# Patient Record
Sex: FEMALE | Race: WHITE | NOT HISPANIC OR LATINO | Employment: OTHER | URBAN - METROPOLITAN AREA
[De-identification: names, ages, dates, MRNs, and addresses within clinical notes are randomized per-mention and may not be internally consistent; named-entity substitution may affect disease eponyms.]

---

## 2017-01-30 ENCOUNTER — RECORDS - HEALTHEAST (OUTPATIENT)
Dept: LAB | Facility: CLINIC | Age: 57
End: 2017-01-30

## 2017-01-30 LAB
CHOLEST SERPL-MCNC: 245 MG/DL
FASTING STATUS PATIENT QL REPORTED: YES
HDLC SERPL-MCNC: 68 MG/DL
LDLC SERPL CALC-MCNC: 144 MG/DL
TRIGL SERPL-MCNC: 167 MG/DL

## 2019-07-18 ENCOUNTER — RECORDS - HEALTHEAST (OUTPATIENT)
Dept: LAB | Facility: CLINIC | Age: 59
End: 2019-07-18

## 2019-07-18 LAB
ANION GAP SERPL CALCULATED.3IONS-SCNC: 9 MMOL/L (ref 5–18)
BUN SERPL-MCNC: 16 MG/DL (ref 8–22)
CALCIUM SERPL-MCNC: 10.6 MG/DL (ref 8.5–10.5)
CHLORIDE BLD-SCNC: 101 MMOL/L (ref 98–107)
CHOLEST SERPL-MCNC: 201 MG/DL
CO2 SERPL-SCNC: 27 MMOL/L (ref 22–31)
CREAT SERPL-MCNC: 0.76 MG/DL (ref 0.6–1.1)
FASTING STATUS PATIENT QL REPORTED: YES
GFR SERPL CREATININE-BSD FRML MDRD: >60 ML/MIN/1.73M2
GLUCOSE BLD-MCNC: 89 MG/DL (ref 70–125)
HDLC SERPL-MCNC: 51 MG/DL
LDLC SERPL CALC-MCNC: 132 MG/DL
POTASSIUM BLD-SCNC: 4.2 MMOL/L (ref 3.5–5)
SODIUM SERPL-SCNC: 137 MMOL/L (ref 136–145)
TRIGL SERPL-MCNC: 91 MG/DL

## 2020-08-13 ENCOUNTER — RECORDS - HEALTHEAST (OUTPATIENT)
Dept: LAB | Facility: CLINIC | Age: 60
End: 2020-08-13

## 2020-08-13 LAB
ANION GAP SERPL CALCULATED.3IONS-SCNC: 11 MMOL/L (ref 5–18)
BUN SERPL-MCNC: 12 MG/DL (ref 8–22)
CALCIUM SERPL-MCNC: 10.4 MG/DL (ref 8.5–10.5)
CHLORIDE BLD-SCNC: 102 MMOL/L (ref 98–107)
CHOLEST SERPL-MCNC: 221 MG/DL
CO2 SERPL-SCNC: 26 MMOL/L (ref 22–31)
CREAT SERPL-MCNC: 0.8 MG/DL (ref 0.6–1.1)
FASTING STATUS PATIENT QL REPORTED: YES
GFR SERPL CREATININE-BSD FRML MDRD: >60 ML/MIN/1.73M2
GLUCOSE BLD-MCNC: 97 MG/DL (ref 70–125)
HDLC SERPL-MCNC: 66 MG/DL
LDLC SERPL CALC-MCNC: 133 MG/DL
POTASSIUM BLD-SCNC: 4 MMOL/L (ref 3.5–5)
SODIUM SERPL-SCNC: 139 MMOL/L (ref 136–145)
TRIGL SERPL-MCNC: 109 MG/DL

## 2021-05-24 ENCOUNTER — RECORDS - HEALTHEAST (OUTPATIENT)
Dept: ADMINISTRATIVE | Facility: CLINIC | Age: 61
End: 2021-05-24

## 2021-05-25 ENCOUNTER — RECORDS - HEALTHEAST (OUTPATIENT)
Dept: ADMINISTRATIVE | Facility: CLINIC | Age: 61
End: 2021-05-25

## 2021-09-20 ENCOUNTER — LAB REQUISITION (OUTPATIENT)
Dept: LAB | Facility: CLINIC | Age: 61
End: 2021-09-20

## 2021-09-20 DIAGNOSIS — I10 ESSENTIAL (PRIMARY) HYPERTENSION: ICD-10-CM

## 2021-09-20 DIAGNOSIS — E78.5 HYPERLIPIDEMIA, UNSPECIFIED: ICD-10-CM

## 2021-09-20 LAB
ANION GAP SERPL CALCULATED.3IONS-SCNC: 12 MMOL/L (ref 5–18)
BUN SERPL-MCNC: 12 MG/DL (ref 8–22)
CALCIUM SERPL-MCNC: 10.7 MG/DL (ref 8.5–10.5)
CHLORIDE BLD-SCNC: 100 MMOL/L (ref 98–107)
CHOLEST SERPL-MCNC: 219 MG/DL
CO2 SERPL-SCNC: 25 MMOL/L (ref 22–31)
CREAT SERPL-MCNC: 0.79 MG/DL (ref 0.6–1.1)
GFR SERPL CREATININE-BSD FRML MDRD: 80 ML/MIN/1.73M2
GLUCOSE BLD-MCNC: 97 MG/DL (ref 70–125)
HDLC SERPL-MCNC: 71 MG/DL
LDLC SERPL CALC-MCNC: 126 MG/DL
POTASSIUM BLD-SCNC: 4 MMOL/L (ref 3.5–5)
SODIUM SERPL-SCNC: 137 MMOL/L (ref 136–145)
TRIGL SERPL-MCNC: 108 MG/DL

## 2021-09-20 PROCEDURE — 36415 COLL VENOUS BLD VENIPUNCTURE: CPT | Performed by: PHYSICIAN ASSISTANT

## 2021-09-20 PROCEDURE — 80061 LIPID PANEL: CPT | Performed by: PHYSICIAN ASSISTANT

## 2021-09-20 PROCEDURE — 80048 BASIC METABOLIC PNL TOTAL CA: CPT | Performed by: PHYSICIAN ASSISTANT

## 2022-09-30 ENCOUNTER — LAB REQUISITION (OUTPATIENT)
Dept: LAB | Facility: CLINIC | Age: 62
End: 2022-09-30

## 2022-09-30 DIAGNOSIS — E78.5 HYPERLIPIDEMIA, UNSPECIFIED: ICD-10-CM

## 2022-09-30 DIAGNOSIS — Z11.59 ENCOUNTER FOR SCREENING FOR OTHER VIRAL DISEASES: ICD-10-CM

## 2022-09-30 DIAGNOSIS — I10 ESSENTIAL (PRIMARY) HYPERTENSION: ICD-10-CM

## 2022-09-30 LAB
ANION GAP SERPL CALCULATED.3IONS-SCNC: 10 MMOL/L (ref 7–15)
BUN SERPL-MCNC: 11.1 MG/DL (ref 8–23)
CALCIUM SERPL-MCNC: 10.2 MG/DL (ref 8.8–10.2)
CHLORIDE SERPL-SCNC: 96 MMOL/L (ref 98–107)
CHOLEST SERPL-MCNC: 228 MG/DL
CREAT SERPL-MCNC: 0.74 MG/DL (ref 0.51–0.95)
DEPRECATED HCO3 PLAS-SCNC: 28 MMOL/L (ref 22–29)
GFR SERPL CREATININE-BSD FRML MDRD: 90 ML/MIN/1.73M2
GLUCOSE SERPL-MCNC: 97 MG/DL (ref 70–99)
HDLC SERPL-MCNC: 78 MG/DL
LDLC SERPL CALC-MCNC: 136 MG/DL
NONHDLC SERPL-MCNC: 150 MG/DL
POTASSIUM SERPL-SCNC: 3.6 MMOL/L (ref 3.4–5.3)
SODIUM SERPL-SCNC: 134 MMOL/L (ref 136–145)
TRIGL SERPL-MCNC: 71 MG/DL

## 2022-09-30 PROCEDURE — 80048 BASIC METABOLIC PNL TOTAL CA: CPT | Performed by: PHYSICIAN ASSISTANT

## 2022-09-30 PROCEDURE — 80061 LIPID PANEL: CPT | Performed by: PHYSICIAN ASSISTANT

## 2022-09-30 PROCEDURE — 86787 VARICELLA-ZOSTER ANTIBODY: CPT | Performed by: PHYSICIAN ASSISTANT

## 2022-10-03 LAB
VZV IGG SER QL IA: 44.1 INDEX
VZV IGG SER QL IA: NORMAL

## 2023-10-02 ENCOUNTER — LAB REQUISITION (OUTPATIENT)
Dept: LAB | Facility: CLINIC | Age: 63
End: 2023-10-02
Payer: COMMERCIAL

## 2023-10-02 DIAGNOSIS — I10 ESSENTIAL (PRIMARY) HYPERTENSION: ICD-10-CM

## 2023-10-02 LAB
ANION GAP SERPL CALCULATED.3IONS-SCNC: 13 MMOL/L (ref 7–15)
BUN SERPL-MCNC: 10.6 MG/DL (ref 8–23)
CALCIUM SERPL-MCNC: 11.1 MG/DL (ref 8.8–10.2)
CHLORIDE SERPL-SCNC: 100 MMOL/L (ref 98–107)
CHOLEST SERPL-MCNC: 197 MG/DL
CREAT SERPL-MCNC: 0.75 MG/DL (ref 0.51–0.95)
DEPRECATED HCO3 PLAS-SCNC: 24 MMOL/L (ref 22–29)
EGFRCR SERPLBLD CKD-EPI 2021: 88 ML/MIN/1.73M2
GLUCOSE SERPL-MCNC: 104 MG/DL (ref 70–99)
HDLC SERPL-MCNC: 65 MG/DL
LDLC SERPL CALC-MCNC: 111 MG/DL
NONHDLC SERPL-MCNC: 132 MG/DL
POTASSIUM SERPL-SCNC: 4 MMOL/L (ref 3.4–5.3)
SODIUM SERPL-SCNC: 137 MMOL/L (ref 135–145)
TRIGL SERPL-MCNC: 105 MG/DL

## 2023-10-02 PROCEDURE — 80048 BASIC METABOLIC PNL TOTAL CA: CPT | Mod: ORL | Performed by: PHYSICIAN ASSISTANT

## 2023-10-02 PROCEDURE — 80061 LIPID PANEL: CPT | Mod: ORL | Performed by: PHYSICIAN ASSISTANT

## 2023-10-16 ENCOUNTER — LAB REQUISITION (OUTPATIENT)
Dept: LAB | Facility: CLINIC | Age: 63
End: 2023-10-16

## 2023-10-16 DIAGNOSIS — E83.52 HYPERCALCEMIA: ICD-10-CM

## 2023-10-16 LAB
CALCIUM SERPL-MCNC: 10 MG/DL (ref 8.8–10.2)
CREAT SERPL-MCNC: 0.69 MG/DL (ref 0.51–0.95)
EGFRCR SERPLBLD CKD-EPI 2021: >90 ML/MIN/1.73M2
PHOSPHATE SERPL-MCNC: 2.9 MG/DL (ref 2.5–4.5)
PTH-INTACT SERPL-MCNC: 41 PG/ML (ref 15–65)

## 2023-10-16 PROCEDURE — 84100 ASSAY OF PHOSPHORUS: CPT | Performed by: PHYSICIAN ASSISTANT

## 2023-10-16 PROCEDURE — 82310 ASSAY OF CALCIUM: CPT | Performed by: PHYSICIAN ASSISTANT

## 2023-10-20 ENCOUNTER — HOSPITAL ENCOUNTER (OUTPATIENT)
Dept: BONE DENSITY | Facility: HOSPITAL | Age: 63
Discharge: HOME | End: 2023-10-20
Attending: PHYSICIAN ASSISTANT
Payer: COMMERCIAL

## 2023-10-20 ENCOUNTER — ANCILLARY PROCEDURE (OUTPATIENT)
Dept: MAMMOGRAPHY | Facility: HOSPITAL | Age: 63
End: 2023-10-20
Attending: PHYSICIAN ASSISTANT
Payer: COMMERCIAL

## 2023-10-20 DIAGNOSIS — Z78.0 ASYMPTOMATIC AGE-RELATED POSTMENOPAUSAL STATE: ICD-10-CM

## 2023-10-20 DIAGNOSIS — Z12.31 ENCOUNTER FOR SCREENING MAMMOGRAM FOR MALIGNANT NEOPLASM OF BREAST: ICD-10-CM

## 2023-10-20 PROCEDURE — 77080 DXA BONE DENSITY AXIAL: CPT

## 2023-10-20 PROCEDURE — 77067 SCR MAMMO BI INCL CAD: CPT

## 2023-10-31 ENCOUNTER — OFFICE VISIT (OUTPATIENT)
Dept: PLASTIC SURGERY | Facility: CLINIC | Age: 63
End: 2023-10-31

## 2023-10-31 DIAGNOSIS — Z41.1 ENCOUNTER FOR COSMETIC PROCEDURE: Primary | ICD-10-CM

## 2023-10-31 RX ORDER — TRETINOIN 0.5 MG/G
CREAM TOPICAL AT BEDTIME
Qty: 45 G | Refills: 3 | Status: SHIPPED | OUTPATIENT
Start: 2023-10-31

## 2023-10-31 NOTE — PROGRESS NOTES
Updated photodocumentation obtained.    Patient given quote for dermabrasion at appointment today. Quote was explained in detail, including but not limited to the $500.00 non-refundable deposit due at time of scheduling, when/where payment is due, and six weeks minimum cancellation notice. Patient verbalized understanding of quote. Signed quote was obtained (see media tab), a copy sent home with pt. Education for quoted procedures was provided both verbally and in educational take home folder including pre & post op care, medications to avoid before and after surgery, and more.     Pt given card for [facility] as well as Retinol Education Sheet.    All questions answered at this time. Pt will reach out if questions arise.    Misty Bonilla RN  10/31/2023 10:13 AM

## 2023-10-31 NOTE — PROGRESS NOTES
Facial Plastic and Reconstructive Surgery Cosmetic Consultation  10/31/23     HPI:   I had the pleasure of seeing Dany Diggs today in clinic for consultation for scar revision.   Dany Diggs is a 63 year old female.  She reports struggling with acne for many years.  She has multiple treatments for acne scarring.  This has included fullface dermabrasion, microneedling, lasers, filler.  She feels like the only thing that really helped her was the dermabrasion and that was almost 20 years ago and had a long recovery.  She is finding that as she is aging the scarring is getting more noticeable, especially on her cheeks, right greater than left.  She is interested in talking about options.      PE:  Alert and Oriented, Answering Questions Appropriately  Atraumatic, Normocephalic, Face Symmetric  Skin: Martinez 2  She has acne scarring present at the right greater than left cheeks.  There is also scarring around the mouth and the chin and pridinol sulcus area extending onto her cheeks.  No significant discoloration.  The scars are crater-like and not hypertrophic.      IMPRESSION/PLAN: This is a very pleasant 63-year-old female presenting for evaluation of acne scarring.  She had multiple treatments in the past.  We discussed many options today.  I do recommend her starting tretinoin and I put a prescription for her for that.  We discussed instructions on how to use that with the goal of using it every day.  We also discussed options including fullface dermabrasion and subscision.  I think these are good options.  We also discussed as a last resort cutting out some of the more deep scars.  I would also like her to go see the Dr. Ramesh to see if they have any lasers that she may benefit from. She will let us know what she decides.    Photodocumentation was obtained.     Risks and benefits of the procedure were discussed, including but not limited to hyper or hypopigmentation, scarring,  bleeding/bruising, irregularities of skin and underlying soft tissue, infection, asymmetry, and the need for additional procedures.

## 2023-12-14 RX ORDER — HYDROCHLOROTHIAZIDE 12.5 MG/1
CAPSULE ORAL
COMMUNITY
Start: 2023-08-14

## 2023-12-14 RX ORDER — LISINOPRIL 10 MG/1
TABLET ORAL
COMMUNITY
Start: 2023-08-14

## 2023-12-23 ENCOUNTER — HEALTH MAINTENANCE LETTER (OUTPATIENT)
Age: 63
End: 2023-12-23

## 2024-10-04 ENCOUNTER — LAB REQUISITION (OUTPATIENT)
Dept: LAB | Facility: CLINIC | Age: 64
End: 2024-10-04

## 2024-10-04 DIAGNOSIS — E83.52 HYPERCALCEMIA: ICD-10-CM

## 2024-10-04 DIAGNOSIS — E78.5 HYPERLIPIDEMIA, UNSPECIFIED: ICD-10-CM

## 2024-10-04 DIAGNOSIS — I10 ESSENTIAL (PRIMARY) HYPERTENSION: ICD-10-CM

## 2024-10-04 LAB
ANION GAP SERPL CALCULATED.3IONS-SCNC: 11 MMOL/L (ref 7–15)
BUN SERPL-MCNC: 9.4 MG/DL (ref 8–23)
CALCIUM SERPL-MCNC: 9.8 MG/DL (ref 8.8–10.4)
CHLORIDE SERPL-SCNC: 98 MMOL/L (ref 98–107)
CHOLEST SERPL-MCNC: 225 MG/DL
CREAT SERPL-MCNC: 0.75 MG/DL (ref 0.51–0.95)
EGFRCR SERPLBLD CKD-EPI 2021: 87 ML/MIN/1.73M2
FASTING STATUS PATIENT QL REPORTED: ABNORMAL
FASTING STATUS PATIENT QL REPORTED: ABNORMAL
GLUCOSE SERPL-MCNC: 104 MG/DL (ref 70–99)
HCO3 SERPL-SCNC: 26 MMOL/L (ref 22–29)
HDLC SERPL-MCNC: 82 MG/DL
LDLC SERPL CALC-MCNC: 127 MG/DL
NONHDLC SERPL-MCNC: 143 MG/DL
POTASSIUM SERPL-SCNC: 4 MMOL/L (ref 3.4–5.3)
PTH-INTACT SERPL-MCNC: 44 PG/ML (ref 15–65)
SODIUM SERPL-SCNC: 135 MMOL/L (ref 135–145)
TRIGL SERPL-MCNC: 80 MG/DL

## 2024-10-04 PROCEDURE — 83970 ASSAY OF PARATHORMONE: CPT | Performed by: PHYSICIAN ASSISTANT

## 2024-10-04 PROCEDURE — 80048 BASIC METABOLIC PNL TOTAL CA: CPT | Performed by: PHYSICIAN ASSISTANT

## 2024-10-04 PROCEDURE — 80061 LIPID PANEL: CPT | Performed by: PHYSICIAN ASSISTANT

## 2025-01-18 ENCOUNTER — HEALTH MAINTENANCE LETTER (OUTPATIENT)
Age: 65
End: 2025-01-18

## 2025-03-03 ENCOUNTER — LAB REQUISITION (OUTPATIENT)
Dept: LAB | Facility: CLINIC | Age: 65
End: 2025-03-03

## 2025-03-03 DIAGNOSIS — M62.838 OTHER MUSCLE SPASM: ICD-10-CM

## 2025-03-03 PROCEDURE — 80051 ELECTROLYTE PANEL: CPT | Performed by: PHYSICIAN ASSISTANT

## 2025-03-03 PROCEDURE — 82550 ASSAY OF CK (CPK): CPT | Performed by: PHYSICIAN ASSISTANT

## 2025-03-03 PROCEDURE — 82040 ASSAY OF SERUM ALBUMIN: CPT | Performed by: PHYSICIAN ASSISTANT

## 2025-03-04 LAB
ALBUMIN SERPL BCG-MCNC: 4.2 G/DL (ref 3.5–5.2)
ALP SERPL-CCNC: 65 U/L (ref 40–150)
ALT SERPL W P-5'-P-CCNC: 16 U/L (ref 0–50)
ANION GAP SERPL CALCULATED.3IONS-SCNC: 11 MMOL/L (ref 7–15)
AST SERPL W P-5'-P-CCNC: 18 U/L (ref 0–45)
BILIRUB SERPL-MCNC: 0.5 MG/DL
BUN SERPL-MCNC: 15.1 MG/DL (ref 8–23)
CALCIUM SERPL-MCNC: 10 MG/DL (ref 8.8–10.4)
CHLORIDE SERPL-SCNC: 95 MMOL/L (ref 98–107)
CK SERPL-CCNC: 107 U/L (ref 26–192)
CREAT SERPL-MCNC: 0.7 MG/DL (ref 0.51–0.95)
EGFRCR SERPLBLD CKD-EPI 2021: >90 ML/MIN/1.73M2
GLUCOSE SERPL-MCNC: 109 MG/DL (ref 70–99)
HCO3 SERPL-SCNC: 25 MMOL/L (ref 22–29)
POTASSIUM SERPL-SCNC: 3.8 MMOL/L (ref 3.4–5.3)
PROT SERPL-MCNC: 7.1 G/DL (ref 6.4–8.3)
SODIUM SERPL-SCNC: 131 MMOL/L (ref 135–145)

## 2025-04-24 ENCOUNTER — TRANSFERRED RECORDS (OUTPATIENT)
Dept: HEALTH INFORMATION MANAGEMENT | Facility: CLINIC | Age: 65
End: 2025-04-24
Payer: COMMERCIAL

## 2025-05-02 ENCOUNTER — TRANSFERRED RECORDS (OUTPATIENT)
Dept: HEALTH INFORMATION MANAGEMENT | Facility: CLINIC | Age: 65
End: 2025-05-02
Payer: COMMERCIAL

## 2025-05-02 ENCOUNTER — ANCILLARY PROCEDURE (OUTPATIENT)
Dept: RADIOLOGY | Facility: CLINIC | Age: 65
End: 2025-05-02
Payer: COMMERCIAL

## 2025-05-06 ENCOUNTER — TRANSCRIBE ORDERS (OUTPATIENT)
Dept: OTHER | Age: 65
End: 2025-05-06

## 2025-05-06 ENCOUNTER — MEDICAL CORRESPONDENCE (OUTPATIENT)
Dept: HEALTH INFORMATION MANAGEMENT | Facility: CLINIC | Age: 65
End: 2025-05-06
Payer: COMMERCIAL

## 2025-05-06 DIAGNOSIS — C50.911 INVASIVE DUCTAL CARCINOMA OF RIGHT BREAST (H): Primary | ICD-10-CM

## 2025-05-07 ENCOUNTER — LAB (OUTPATIENT)
Dept: LAB | Facility: CLINIC | Age: 65
End: 2025-05-07
Payer: COMMERCIAL

## 2025-05-07 ENCOUNTER — OFFICE VISIT (OUTPATIENT)
Dept: SURGERY | Facility: CLINIC | Age: 65
End: 2025-05-07
Attending: SURGERY
Payer: COMMERCIAL

## 2025-05-07 ENCOUNTER — PATIENT OUTREACH (OUTPATIENT)
Dept: ONCOLOGY | Facility: CLINIC | Age: 65
End: 2025-05-07

## 2025-05-07 VITALS — WEIGHT: 147 LBS | BODY MASS INDEX: 25.1 KG/M2 | HEIGHT: 64 IN

## 2025-05-07 DIAGNOSIS — C50.911 INVASIVE DUCTAL CARCINOMA OF RIGHT BREAST (H): ICD-10-CM

## 2025-05-07 PROCEDURE — 99204 OFFICE O/P NEW MOD 45 MIN: CPT | Performed by: SURGERY

## 2025-05-07 PROCEDURE — G0463 HOSPITAL OUTPT CLINIC VISIT: HCPCS | Performed by: SURGERY

## 2025-05-07 RX ORDER — VALACYCLOVIR HYDROCHLORIDE 500 MG/1
TABLET, FILM COATED ORAL
COMMUNITY
Start: 2025-01-08

## 2025-05-07 RX ORDER — ATORVASTATIN CALCIUM 10 MG/1
TABLET, FILM COATED ORAL
COMMUNITY
Start: 2025-03-25

## 2025-05-07 RX ORDER — CYCLOBENZAPRINE HCL 5 MG
TABLET ORAL
COMMUNITY
Start: 2025-04-21

## 2025-05-07 RX ORDER — AMOXICILLIN 500 MG
1200 CAPSULE ORAL DAILY
COMMUNITY

## 2025-05-07 NOTE — PATIENT INSTRUCTIONS
Someone from [facility] will call you to set up an appointment to discuss reconstruction.    Be sure to check with your insurance for coverage.    Call me when you have an appointment and who you will be seeing.    Call with any questions!    Thank you,      Susan Lombardi, RN CBCN  RN Breast Surgery Coordinator  [facility]  [phone]

## 2025-05-07 NOTE — LETTER
5/7/2025      Dany Diggs  [address] [place] [id number]      Dear Colleague,    Thank you for referring your patient, Dany Diggs, to the [facility] [place]. Please see a copy of my visit note below.    History:  This is a 64 year old female who I'm asked to see by Nataly Ortiz PA-C for evaluation of a right breast cancer.  She presents with her sister-in-law, Nova.  This was picked up by the patient.  She randomly felt a mass deep to her right nipple.  She denies having any other breast symptoms along with it such as nipple discharge or skin changes.  Diagnostic mammogram revealed a 12 mm mass in the location of the palpable abnormality.  Ultrasound had a corresponding irregularity along with 2 other small's suspicious nodules measuring 4 and 5 mm respectively.  A needle biopsy was done of the main lesion and one of the smaller satellite lesions which both show a grade II invasive ductal carcinoma.  It is estrogen receptor positive, progesterone receptor positive, and HER-2 indeterminant and pending FISH.  After the biopsies she has had significant ecchymosis.    Past medical history:  HTN  Dyslipidemia  GERD    Past surgical history:  Salpingectomy for ectopic pregnancy  Nasal surgery    Medications:     atorvastatin (LIPITOR) 10 MG tablet, , Disp: , Rfl:      cyclobenzaprine (FLEXERIL) 5 MG tablet, , Disp: , Rfl:      esomeprazole (NEXIUM) 20 MG DR capsule, Take 20 mg by mouth., Disp: , Rfl:      hydrochlorothiazide (MICROZIDE) 12.5 MG capsule, , Disp: , Rfl:      lisinopril (ZESTRIL) 10 MG tablet, , Disp: , Rfl:      valACYclovir (VALTREX) 500 MG tablet, , Disp: , Rfl:      tretinoin (RETIN-A) 0.05 % external cream, Apply topically at bedtime, Disp: 45 g, Rfl: 3    Allergies:  Sulfa  Codeine  Morphine    Social History:  Consumes alcohol socially.  Denies tobacco, marijuana, and illicit drug use.    Family History:  A sister had breast cancer in her 30s.   "A brother had melanoma.    Review of systems:  General ROS: No complaints or constitutional symptoms  Skin: No complaints or symptoms   Hematologic/Lymphatic: No symptoms or complaints  Psychiatric: No symptoms or complaints  Endocrine: No excessive fatigue, no hypermetabolic symptoms reported  Respiratory ROS: No cough, shortness of breath, or wheezing  Cardiovascular ROS: No chest pain or dyspnea on exertion  Breast ROS: Per HPI  Gastrointestinal ROS: No abdominal pain, nausea, diarrhea, or constipation  Musculoskeletal ROS: No recent injuries reported  Neurological ROS: No focal neurologic defects reported.      Physical exam:  Ht 1.626 m (5' 4\")   Wt 66.7 kg (147 lb)   BMI 25.23 kg/m    General: Alert, cooperative, appears stated age   Skin: Skin color, texture, turgor normal, no rashes or lesions   Lymphatic: No obvious adenopathy, no swelling   Eyes: No scleral icterus, pupils equal  HENT: No traumatic injury to the head or face, no gross abnormalities  Lungs: Normal respiratory effort, breath sounds equal bilaterally  Heart: Regular rate and rhythm  Breasts: Ecchymosis to the lower half of the right breast.  There is a 1 cm superficial mass deep to the areola at 7:00.  No other palpable abnormalities bilaterally.  Abdomen: Soft, non-distended and non-tender to palpation  Neurologic: Grossly intact    Imaging:  Pertinent images personally reviewed by myself and discussed with the patient.    Radiology reports:  Mammogram of the right breast demonstrates a 12 mm irregular mass in the subareolar tissue.  Ultrasound reveals a 12 x 7 x 8 mm irregular lobulated mass corresponding to the mammographic abnormality.  There are 2 smaller lesions in the adjacent parenchyma measuring 4 and 5 mm respectively.  The axilla is negative.    My interpretation:  Dense breast tissue.  2 biopsy clips seen approximately 4.5 cm apart.    Pathology:  A) RIGHT BREAST, 7:00, 1 CM FROM NIPPLE, ULTRASOUND-GUIDED CORE BIOPSY:   1. " Invasive ductal carcinoma      a. [place] grade: II of III; [place] score: 6 of 9      b. Angio-lymphatic invasion: Absent      c. Associated DCIS: Present      d. Subtype: Solid and papillary with focal necrosis      e. Grade of DCIS: 1-2 of 3   2. Breast Ancillary Testing:        a. Hormone Receptors:             Estrogen receptor: Positive (96%, strong staining)             Progesterone receptor: Positive (76%, moderate staining)       b. HER2 by IHC: Equivocal (2+ by manual morphometry; reflex to FISH, ordered 5/6/2025)       c. Ki-67: 22%     B) RIGHT BREAST, 7:00, 3 CM FROM NIPPLE, ULTRASOUND-GUIDED CORE BIOPSY:   1. Invasive ductal carcinoma measuring 3 mm      a. [place] grade: II of III; [place] score: 6 of 9      b. Angio-lymphatic invasion: Absent      c. Associated DCIS: Indeterminate   2. Estrogen and progesterone receptor immunohistochemistry, and HER2      analysis are deferred to part A     IMPRESSION:  Right breast multifocal invasive ductal carcinoma     - Grade II, mT1, N0, ER/IA+, HER2 pending FISH  -->  anatomic stage IA    PLAN:   Discussed the surgical options for treatment of breast cancer which generally are a lumpectomy (partial mastectomy) combined with radiation versus a mastectomy.  Explained that the survival benefit is the same for both.  The difference is in local recurrence risk.  The patient is a poor candidate for a lumpectomy.  I estimate we would need to remove at least 4 cm of breast tissue to ensure clear margins.  Her tumor is also close to the nipple.  Cosmetically it would not be favorable.  Discussed SLN biopsy.  The procedure and rationale were explained.  Discussed that at this point we do not know yet whether or not she will need chemotherapy and we may not know until we get all of the results of surgery back.  Sometimes the need for chemotherapy is dependent upon an Oncotype score or HER2 positivity.  Since the tumor is estrogen receptor positive, she will  be a candidate for endocrine therapy.    After our discussion, all questions were answered to satisfaction.  With her personal and family history of breast cancer, a referral has been placed to genetic counseling.  We will also initiate the breast actionable panel.  Surgically, she is interested in pursuing right mastectomy with immediate reconstruction.  Therefore, she will meet with plastic surgery next.  A referral has been placed and she will let us know who she will be seeing.  We will plan to schedule a right mastectomy with sentinel lymph node biopsy at the patient's earliest availability.  A delay in care could seriously jeopardize the patient's life and health.  The procedure is performed under general anesthesia.  Most women are discharged home the same day with drains. The risks and benefits of surgery were explained.  Also talked about expected recovery time.  She would then follow-up with myself 2 weeks after surgery to review final pathology and next steps.    Zulay Larson DO  General Surgeon  [facility]  Breast [address] [address]  Office: [phone]  Employed by - [facility]        Again, thank you for allowing me to participate in the care of your patient.        Sincerely,        Zulay Larson DO    Electronically signed

## 2025-05-07 NOTE — PROGRESS NOTES
Writer received referral to Cancer Risk Management/Genetic Counseling.    Referred for:   Invasive ductal carcinoma of right breast (H)      Referred By    Provider Department Location Phone   Zulay Larson DO [facility] [phone]     Referral reviewed for appropriate plan, and sent to New Patient Scheduling ([phone]) for completion.    Bernie Garcia, RN, BSN  Oncology New Patient Nurse Navigator   [facility]

## 2025-05-07 NOTE — NURSING NOTE
Dany presents to [facility] today for a surgical consult with Dr. Larson  regarding her newly diagnosed  breast cancer.  She is accompanied by her sister in law for consult.  RN assessment and EMR update.  Patient given a Breast Cancer Packet, contents reviewed.  She met with Dr. Larson  see dictation for details of visit. She will plan genetic testing lab draw today.  Consent signed.  She will make an appointment with  Plastic surgeon group and call with whom she is scheduled with.  Instructed her to check with her insurance company for in-network. As of now, she is thinking of having a unilateral mastectomy with sentinel node biopsy.  Support provided, invited calls.

## 2025-05-08 ENCOUNTER — TELEPHONE (OUTPATIENT)
Dept: SURGERY | Facility: CLINIC | Age: 65
End: 2025-05-08
Payer: COMMERCIAL

## 2025-05-08 ENCOUNTER — PATIENT OUTREACH (OUTPATIENT)
Dept: CARE COORDINATION | Facility: CLINIC | Age: 65
End: 2025-05-08
Payer: COMMERCIAL

## 2025-05-08 NOTE — TELEPHONE ENCOUNTER
I called and talked w/ patient this morning. She has not called her insurance company to check her in network benefits re:  Plastic Surgery. She is going to call them this morning and then call me back. We'll schedule accordingly after that. I will await her return call.

## 2025-05-12 ENCOUNTER — TELEPHONE (OUTPATIENT)
Dept: SURGERY | Facility: CLINIC | Age: 65
End: 2025-05-12
Payer: COMMERCIAL

## 2025-05-12 ENCOUNTER — PATIENT OUTREACH (OUTPATIENT)
Dept: CARE COORDINATION | Facility: CLINIC | Age: 65
End: 2025-05-12
Payer: COMMERCIAL

## 2025-05-12 NOTE — TELEPHONE ENCOUNTER
Called Dany with the results of Her2 by Fish, Negative.  She will proceed as planned with surgery.  She is seeing Dr. Nazario on Thursday, 5-15-25. She is opting for a unilateral mastectomy with immediate reconstruction.  Will notify Dr. Larson and Stephen, who can start working on a surgery date.

## 2025-05-13 NOTE — TELEPHONE ENCOUNTER
Dany and I are playing phone tag. In my  return call to her, I left a detailed message, offering 06.18.25 at [facility]. I will await her return call.

## 2025-05-13 NOTE — TELEPHONE ENCOUNTER
Dany returned my call and she is confirmed for surgery w/ Dr. Larson on 06.18.25 at [facility]. We went over details and I let her know I will send a confirmation letter to her MyChart. She's in agreement with the plan.

## 2025-05-13 NOTE — TELEPHONE ENCOUNTER
LM x1 to schedule surgery w/ Dr. Larson. Will offer 06.18.25 at [facility] when she returns my call.

## 2025-05-15 ENCOUNTER — MYC MEDICAL ADVICE (OUTPATIENT)
Dept: SURGERY | Facility: CLINIC | Age: 65
End: 2025-05-15
Payer: COMMERCIAL

## 2025-05-15 LAB — INTERPRETATION SERPL IEP-IMP: NORMAL

## 2025-05-22 ENCOUNTER — LAB (OUTPATIENT)
Dept: LAB | Facility: CLINIC | Age: 65
End: 2025-05-22
Payer: COMMERCIAL

## 2025-05-22 DIAGNOSIS — C50.911 INVASIVE DUCTAL CARCINOMA OF RIGHT BREAST (H): Primary | ICD-10-CM

## 2025-05-22 PROCEDURE — G0452 MOLECULAR PATHOLOGY INTERPR: HCPCS | Mod: 26 | Performed by: PATHOLOGY

## 2025-05-22 PROCEDURE — 81162 BRCA1&2 GEN FULL SEQ DUP/DEL: CPT | Performed by: SURGERY

## 2025-05-30 ENCOUNTER — LAB REQUISITION (OUTPATIENT)
Dept: LAB | Facility: CLINIC | Age: 65
End: 2025-05-30

## 2025-05-30 DIAGNOSIS — I10 ESSENTIAL (PRIMARY) HYPERTENSION: ICD-10-CM

## 2025-05-30 PROCEDURE — 82374 ASSAY BLOOD CARBON DIOXIDE: CPT | Performed by: PHYSICIAN ASSISTANT

## 2025-05-31 LAB
ANION GAP SERPL CALCULATED.3IONS-SCNC: 11 MMOL/L (ref 7–15)
BUN SERPL-MCNC: 10 MG/DL (ref 8–23)
CALCIUM SERPL-MCNC: 9.8 MG/DL (ref 8.8–10.4)
CHLORIDE SERPL-SCNC: 97 MMOL/L (ref 98–107)
CREAT SERPL-MCNC: 0.76 MG/DL (ref 0.51–0.95)
EGFRCR SERPLBLD CKD-EPI 2021: 86 ML/MIN/1.73M2
GLUCOSE SERPL-MCNC: 90 MG/DL (ref 70–99)
HCO3 SERPL-SCNC: 26 MMOL/L (ref 22–29)
POTASSIUM SERPL-SCNC: 3.9 MMOL/L (ref 3.4–5.3)
SODIUM SERPL-SCNC: 134 MMOL/L (ref 135–145)

## 2025-06-16 RX ORDER — BETAMETHASONE DIPROPIONATE 0.05 %
OINTMENT (GRAM) TOPICAL 2 TIMES DAILY
COMMUNITY

## 2025-06-16 RX ORDER — ASPIRIN 81 MG/1
81 TABLET ORAL DAILY
COMMUNITY
End: 2025-07-02

## 2025-06-16 RX ORDER — LORAZEPAM 0.5 MG/1
0.5 TABLET ORAL EVERY 6 HOURS PRN
COMMUNITY

## 2025-06-18 ENCOUNTER — ANESTHESIA EVENT (OUTPATIENT)
Dept: SURGERY | Facility: HOSPITAL | Age: 65
End: 2025-06-18
Payer: COMMERCIAL

## 2025-06-18 ENCOUNTER — HOSPITAL ENCOUNTER (OUTPATIENT)
Facility: HOSPITAL | Age: 65
Discharge: HOME | End: 2025-06-18
Attending: SURGERY | Admitting: SURGERY
Payer: COMMERCIAL

## 2025-06-18 ENCOUNTER — ANESTHESIA (OUTPATIENT)
Dept: SURGERY | Facility: HOSPITAL | Age: 65
End: 2025-06-18
Payer: COMMERCIAL

## 2025-06-18 ENCOUNTER — HOSPITAL ENCOUNTER (OUTPATIENT)
Dept: NUCLEAR MEDICINE | Facility: HOSPITAL | Age: 65
Discharge: HOME | End: 2025-06-18
Attending: SURGERY | Admitting: SURGERY
Payer: COMMERCIAL

## 2025-06-18 VITALS
OXYGEN SATURATION: 98 % | RESPIRATION RATE: 16 BRPM | HEART RATE: 73 BPM | DIASTOLIC BLOOD PRESSURE: 87 MMHG | SYSTOLIC BLOOD PRESSURE: 161 MMHG | TEMPERATURE: 97.3 F | WEIGHT: 143.9 LBS | BODY MASS INDEX: 24.7 KG/M2

## 2025-06-18 DIAGNOSIS — Z85.3 HISTORY OF BREAST CANCER: Primary | ICD-10-CM

## 2025-06-18 DIAGNOSIS — C50.911 INVASIVE DUCTAL CARCINOMA OF RIGHT BREAST (H): ICD-10-CM

## 2025-06-18 PROCEDURE — 370N000017 HC ANESTHESIA TECHNICAL FEE, PER MIN: Performed by: SURGERY

## 2025-06-18 PROCEDURE — 250N000011 HC RX IP 250 OP 636: Performed by: PLASTIC SURGERY

## 2025-06-18 PROCEDURE — 272N000001 HC OR GENERAL SUPPLY STERILE: Performed by: SURGERY

## 2025-06-18 PROCEDURE — 250N000011 HC RX IP 250 OP 636: Performed by: SURGERY

## 2025-06-18 PROCEDURE — 343N000001 HC RX 343 MED OP 636: Performed by: SURGERY

## 2025-06-18 PROCEDURE — 250N000009 HC RX 250: Performed by: NURSE ANESTHETIST, CERTIFIED REGISTERED

## 2025-06-18 PROCEDURE — 250N000013 HC RX MED GY IP 250 OP 250 PS 637: Performed by: PHYSICIAN ASSISTANT

## 2025-06-18 PROCEDURE — 360N000076 HC SURGERY LEVEL 3, PER MIN: Performed by: SURGERY

## 2025-06-18 PROCEDURE — 250N000011 HC RX IP 250 OP 636: Performed by: NURSE ANESTHETIST, CERTIFIED REGISTERED

## 2025-06-18 PROCEDURE — 88342 IMHCHEM/IMCYTCHM 1ST ANTB: CPT | Mod: TC | Performed by: SURGERY

## 2025-06-18 PROCEDURE — 38525 BIOPSY/REMOVAL LYMPH NODES: CPT | Mod: 51 | Performed by: SURGERY

## 2025-06-18 PROCEDURE — 19303 MAST SIMPLE COMPLETE: CPT | Mod: RT | Performed by: SURGERY

## 2025-06-18 PROCEDURE — 250N000009 HC RX 250: Performed by: ANESTHESIOLOGY

## 2025-06-18 PROCEDURE — A9520 TC99 TILMANOCEPT DIAG 0.5MCI: HCPCS | Performed by: SURGERY

## 2025-06-18 PROCEDURE — 710N000009 HC RECOVERY PHASE 1, LEVEL 1, PER MIN: Performed by: SURGERY

## 2025-06-18 PROCEDURE — 38792 RA TRACER ID OF SENTINL NODE: CPT

## 2025-06-18 PROCEDURE — 258N000003 HC RX IP 258 OP 636: Performed by: NURSE ANESTHETIST, CERTIFIED REGISTERED

## 2025-06-18 PROCEDURE — L8600 IMPLANT BREAST SILICONE/EQ: HCPCS | Performed by: SURGERY

## 2025-06-18 PROCEDURE — 999N000141 HC STATISTIC PRE-PROCEDURE NURSING ASSESSMENT: Performed by: SURGERY

## 2025-06-18 PROCEDURE — 250N000013 HC RX MED GY IP 250 OP 250 PS 637: Performed by: ANESTHESIOLOGY

## 2025-06-18 PROCEDURE — 710N000012 HC RECOVERY PHASE 2, PER MINUTE: Performed by: SURGERY

## 2025-06-18 PROCEDURE — 258N000003 HC RX IP 258 OP 636: Performed by: ANESTHESIOLOGY

## 2025-06-18 PROCEDURE — 250N000009 HC RX 250: Performed by: PLASTIC SURGERY

## 2025-06-18 PROCEDURE — 250N000025 HC SEVOFLURANE, PER MIN: Performed by: SURGERY

## 2025-06-18 DEVICE — IMPLANTABLE DEVICE: Type: IMPLANTABLE DEVICE | Site: BREAST | Status: FUNCTIONAL

## 2025-06-18 RX ORDER — ONDANSETRON 2 MG/ML
INJECTION INTRAMUSCULAR; INTRAVENOUS PRN
Status: DISCONTINUED | OUTPATIENT
Start: 2025-06-18 | End: 2025-06-18

## 2025-06-18 RX ORDER — SODIUM CHLORIDE, SODIUM LACTATE, POTASSIUM CHLORIDE, CALCIUM CHLORIDE 600; 310; 30; 20 MG/100ML; MG/100ML; MG/100ML; MG/100ML
INJECTION, SOLUTION INTRAVENOUS CONTINUOUS
Status: DISCONTINUED | OUTPATIENT
Start: 2025-06-18 | End: 2025-06-18 | Stop reason: HOSPADM

## 2025-06-18 RX ORDER — ACETAMINOPHEN 325 MG/1
650 TABLET ORAL EVERY 4 HOURS PRN
Qty: 50 TABLET | Refills: 0 | Status: SHIPPED | OUTPATIENT
Start: 2025-06-18 | End: 2025-07-02

## 2025-06-18 RX ORDER — KETAMINE HYDROCHLORIDE 10 MG/ML
INJECTION INTRAMUSCULAR; INTRAVENOUS PRN
Status: DISCONTINUED | OUTPATIENT
Start: 2025-06-18 | End: 2025-06-18

## 2025-06-18 RX ORDER — CEPHALEXIN 500 MG/1
500 CAPSULE ORAL 3 TIMES DAILY
Qty: 21 CAPSULE | Refills: 0 | Status: SHIPPED | OUTPATIENT
Start: 2025-06-18 | End: 2025-06-25

## 2025-06-18 RX ORDER — NALOXONE HYDROCHLORIDE 0.4 MG/ML
0.1 INJECTION, SOLUTION INTRAMUSCULAR; INTRAVENOUS; SUBCUTANEOUS
Status: DISCONTINUED | OUTPATIENT
Start: 2025-06-18 | End: 2025-06-18 | Stop reason: HOSPADM

## 2025-06-18 RX ORDER — PROPOFOL 10 MG/ML
INJECTION, EMULSION INTRAVENOUS PRN
Status: DISCONTINUED | OUTPATIENT
Start: 2025-06-18 | End: 2025-06-18

## 2025-06-18 RX ORDER — ACETAMINOPHEN 325 MG/1
650 TABLET ORAL
Status: DISCONTINUED | OUTPATIENT
Start: 2025-06-18 | End: 2025-06-18 | Stop reason: HOSPADM

## 2025-06-18 RX ORDER — FENTANYL CITRATE 50 UG/ML
25 INJECTION, SOLUTION INTRAMUSCULAR; INTRAVENOUS EVERY 5 MIN PRN
Status: DISCONTINUED | OUTPATIENT
Start: 2025-06-18 | End: 2025-06-18 | Stop reason: HOSPADM

## 2025-06-18 RX ORDER — CEFAZOLIN SODIUM/WATER 2 G/20 ML
2 SYRINGE (ML) INTRAVENOUS
Status: COMPLETED | OUTPATIENT
Start: 2025-06-18 | End: 2025-06-18

## 2025-06-18 RX ORDER — LIDOCAINE 40 MG/G
CREAM TOPICAL
Status: DISCONTINUED | OUTPATIENT
Start: 2025-06-18 | End: 2025-06-18 | Stop reason: HOSPADM

## 2025-06-18 RX ORDER — DEXAMETHASONE SODIUM PHOSPHATE 4 MG/ML
4 INJECTION, SOLUTION INTRA-ARTICULAR; INTRALESIONAL; INTRAMUSCULAR; INTRAVENOUS; SOFT TISSUE
Status: DISCONTINUED | OUTPATIENT
Start: 2025-06-18 | End: 2025-06-18 | Stop reason: HOSPADM

## 2025-06-18 RX ORDER — LABETALOL HYDROCHLORIDE 5 MG/ML
INJECTION, SOLUTION INTRAVENOUS PRN
Status: DISCONTINUED | OUTPATIENT
Start: 2025-06-18 | End: 2025-06-18

## 2025-06-18 RX ORDER — ONDANSETRON 4 MG/1
4 TABLET, ORALLY DISINTEGRATING ORAL EVERY 8 HOURS PRN
Qty: 4 TABLET | Refills: 0 | Status: SHIPPED | OUTPATIENT
Start: 2025-06-18 | End: 2025-07-02

## 2025-06-18 RX ORDER — DEXAMETHASONE SODIUM PHOSPHATE 4 MG/ML
INJECTION, SOLUTION INTRA-ARTICULAR; INTRALESIONAL; INTRAMUSCULAR; INTRAVENOUS; SOFT TISSUE PRN
Status: DISCONTINUED | OUTPATIENT
Start: 2025-06-18 | End: 2025-06-18

## 2025-06-18 RX ORDER — FENTANYL CITRATE 50 UG/ML
INJECTION, SOLUTION INTRAMUSCULAR; INTRAVENOUS PRN
Status: DISCONTINUED | OUTPATIENT
Start: 2025-06-18 | End: 2025-06-18

## 2025-06-18 RX ORDER — HYDROMORPHONE HCL IN WATER/PF 6 MG/30 ML
0.2 PATIENT CONTROLLED ANALGESIA SYRINGE INTRAVENOUS EVERY 5 MIN PRN
Status: DISCONTINUED | OUTPATIENT
Start: 2025-06-18 | End: 2025-06-18 | Stop reason: HOSPADM

## 2025-06-18 RX ORDER — OXYCODONE HYDROCHLORIDE 5 MG/1
5 TABLET ORAL EVERY 6 HOURS PRN
Qty: 10 TABLET | Refills: 0 | Status: SHIPPED | OUTPATIENT
Start: 2025-06-18 | End: 2025-07-02

## 2025-06-18 RX ORDER — ONDANSETRON 2 MG/ML
4 INJECTION INTRAMUSCULAR; INTRAVENOUS EVERY 30 MIN PRN
Status: DISCONTINUED | OUTPATIENT
Start: 2025-06-18 | End: 2025-06-18 | Stop reason: HOSPADM

## 2025-06-18 RX ORDER — OXYCODONE HYDROCHLORIDE 5 MG/1
5 TABLET ORAL
Status: COMPLETED | OUTPATIENT
Start: 2025-06-18 | End: 2025-06-18

## 2025-06-18 RX ORDER — HYDROMORPHONE HCL IN WATER/PF 6 MG/30 ML
0.4 PATIENT CONTROLLED ANALGESIA SYRINGE INTRAVENOUS EVERY 5 MIN PRN
Status: DISCONTINUED | OUTPATIENT
Start: 2025-06-18 | End: 2025-06-18 | Stop reason: HOSPADM

## 2025-06-18 RX ORDER — ONDANSETRON 4 MG/1
4 TABLET, ORALLY DISINTEGRATING ORAL EVERY 30 MIN PRN
Status: DISCONTINUED | OUTPATIENT
Start: 2025-06-18 | End: 2025-06-18 | Stop reason: HOSPADM

## 2025-06-18 RX ORDER — ACETAMINOPHEN 325 MG/1
975 TABLET ORAL ONCE
Status: COMPLETED | OUTPATIENT
Start: 2025-06-18 | End: 2025-06-18

## 2025-06-18 RX ORDER — FENTANYL CITRATE 50 UG/ML
50 INJECTION, SOLUTION INTRAMUSCULAR; INTRAVENOUS EVERY 5 MIN PRN
Status: DISCONTINUED | OUTPATIENT
Start: 2025-06-18 | End: 2025-06-18 | Stop reason: HOSPADM

## 2025-06-18 RX ORDER — AMOXICILLIN 250 MG
1-2 CAPSULE ORAL 2 TIMES DAILY PRN
Qty: 30 TABLET | Refills: 0 | Status: SHIPPED | OUTPATIENT
Start: 2025-06-18 | End: 2025-07-02

## 2025-06-18 RX ORDER — BUPIVACAINE HYDROCHLORIDE 2.5 MG/ML
INJECTION, SOLUTION EPIDURAL; INFILTRATION; INTRACAUDAL; PERINEURAL PRN
Status: DISCONTINUED | OUTPATIENT
Start: 2025-06-18 | End: 2025-06-18 | Stop reason: HOSPADM

## 2025-06-18 RX ADMIN — SODIUM CHLORIDE 8 MCG: 9 INJECTION, SOLUTION INTRAVENOUS at 13:55

## 2025-06-18 RX ADMIN — LABETALOL HYDROCHLORIDE 10 MG: 5 INJECTION, SOLUTION INTRAVENOUS at 13:53

## 2025-06-18 RX ADMIN — FENTANYL CITRATE 50 MCG: 50 INJECTION, SOLUTION INTRAMUSCULAR; INTRAVENOUS at 13:28

## 2025-06-18 RX ADMIN — HYDROMORPHONE HYDROCHLORIDE 0.5 MG: 1 INJECTION, SOLUTION INTRAMUSCULAR; INTRAVENOUS; SUBCUTANEOUS at 13:32

## 2025-06-18 RX ADMIN — ACETAMINOPHEN 975 MG: 325 TABLET ORAL at 12:31

## 2025-06-18 RX ADMIN — HYDROMORPHONE HYDROCHLORIDE 0.5 MG: 1 INJECTION, SOLUTION INTRAMUSCULAR; INTRAVENOUS; SUBCUTANEOUS at 13:36

## 2025-06-18 RX ADMIN — OXYCODONE HYDROCHLORIDE 5 MG: 5 TABLET ORAL at 16:14

## 2025-06-18 RX ADMIN — TILMANOCEPT 0.56 MILLICURIE: KIT at 12:05

## 2025-06-18 RX ADMIN — FENTANYL CITRATE 50 MCG: 50 INJECTION, SOLUTION INTRAMUSCULAR; INTRAVENOUS at 13:14

## 2025-06-18 RX ADMIN — KETAMINE HYDROCHLORIDE 10 MG: 10 INJECTION INTRAMUSCULAR; INTRAVENOUS at 14:07

## 2025-06-18 RX ADMIN — DEXAMETHASONE SODIUM PHOSPHATE 4 MG: 4 INJECTION, SOLUTION INTRA-ARTICULAR; INTRALESIONAL; INTRAMUSCULAR; INTRAVENOUS; SOFT TISSUE at 13:19

## 2025-06-18 RX ADMIN — ROCURONIUM 50 MG: 50 INJECTION, SOLUTION INTRAVENOUS at 13:06

## 2025-06-18 RX ADMIN — PROPOFOL 150 MCG/KG/MIN: 10 INJECTION, EMULSION INTRAVENOUS at 13:06

## 2025-06-18 RX ADMIN — Medication 2 G: at 13:10

## 2025-06-18 RX ADMIN — ONDANSETRON 4 MG: 2 INJECTION INTRAMUSCULAR; INTRAVENOUS at 14:35

## 2025-06-18 RX ADMIN — KETAMINE HYDROCHLORIDE 20 MG: 10 INJECTION INTRAMUSCULAR; INTRAVENOUS at 13:06

## 2025-06-18 RX ADMIN — SUGAMMADEX 200 MG: 100 INJECTION, SOLUTION INTRAVENOUS at 14:42

## 2025-06-18 RX ADMIN — MIDAZOLAM HYDROCHLORIDE 2 MG: 1 INJECTION, SOLUTION INTRAMUSCULAR; INTRAVENOUS at 13:01

## 2025-06-18 RX ADMIN — SODIUM CHLORIDE, SODIUM LACTATE, POTASSIUM CHLORIDE, AND CALCIUM CHLORIDE: .6; .31; .03; .02 INJECTION, SOLUTION INTRAVENOUS at 12:31

## 2025-06-18 RX ADMIN — LIDOCAINE HYDROCHLORIDE 20 ML: 10 INJECTION, SOLUTION INFILTRATION; PERINEURAL at 13:06

## 2025-06-18 ASSESSMENT — ACTIVITIES OF DAILY LIVING (ADL)
ADLS_ACUITY_SCORE: 35
ADLS_ACUITY_SCORE: 18

## 2025-06-18 NOTE — OP NOTE
Name:  Dany Diggs  PCP:  Nataly Ortiz  Procedure Date:  6/18/2025      RIGHT SKIN-SPARING MASTECTOMY WITH SENTINEL LYMPH NODE BIOPSY      Pre-Procedure Diagnosis:  Invasive ductal carcinoma of right breast     Post-Procedure Diagnosis:    Invasive ductal carcinoma of right breast     Surgeon:  Zulay Larson DO    Assist:  Rissa Wagner PA-C    Anesthesia Type:    GET    Estimated Blood Loss:   20 mL    Specimens:    Right breast - stitch superior  Right breast sentinel lymph node       Drains:   Left by Dr. Nazario    Complications:    None apparent    [place] Node Biopsy for Breast Cancer - Right  Operation performed with curative intent Yes   Tracer(s) used to identify sentinel nodes in the upfront surgery (non-neoadjuvant) setting Radioactive tracer   Tracer(s) used to identify sentinel nodes in the neoadjuvant setting N/A   All nodes (colored or non-colored) present at the end of a dye-filled lymphatic channel were removed N/A   All significantly radioactive nodes were removed Yes   All palpably suspicious nodes were removed N/A   Biopsy-proven positive nodes marked with clips prior to chemotherapy were identified and removed N/A       Indication for procedure:  This is a 65-year-old female who recently felt a mass within the right breast.  Diagnostic mammogram revealed a suspicious lesion in that location along with a couple others.  She was found to have a multifocal invasive ductal carcinoma.  Due to the size of her breast and the nipple involvement, it was recommended that she undergo mastectomy.  She has ultimately elected for right mastectomy with reconstruction and a left mastopexy for her surgical treatment.    Operative Report:    The patient was properly identified and brought to the operating suite where she was placed in the supine position.  General anesthesia and perioperative antibiotics were administered.  Preoperatively the patient was injected with Lymphoseek by nuclear  medicine for her sentinel lymph node identification.  The patient was prepped and draped in a sterile fashion.  A triangular shaped incision encompassing the right nipple was made and skin flaps raised superiorly to the clavicle, posteriorly to the pectoralis major, medial to the sternum, inferior to the rectus sheath and laterally to the lateral chest wall.  The skin was spared to allow for immediate reconstruction.  The breast tissue was swept from the chest wall using electrocautery.  It was marked for orientation.  Hemostasis was assured within the wound.  The clavipectoral fascia was incised and the axilla was palpated for abnormalities.  No abnormal lymph nodes were palpated.  Using the gamma probe I identified 2 sentinel lymph nodes, with a count of 60 each.  These were removed with electrocautery and sent for permanent sectioning.  There was no significant remaining gamma activity.  The wound was packed open with a saline soaked gauze.  The procedure was turned over to Dr. Nazario for immediate reconstruction.    Rissa Wagner PA-C was present during the case to assist with retraction and exposure.    Disposition:  The patient remains in the operating room for reconstruction.  It is anticipated that she will be discharged home after drain teaching.    Zulay Larson DO  General Surgeon  [facility]  Breast [address] [address]  Office: [phone]  Employed by - [facility]

## 2025-06-18 NOTE — INTERVAL H&P NOTE
Patient seen in preop.  Denies new medical history since she was last seen.  [place] lymph node injection completed on the right side.  All questions answered regarding procedure.  Consent obtained.  To the OR for right skin sparing mastectomy with sentinel lymph node biopsy followed by immediate reconstruction and left sided mastopexy.  Once we have the surgical pathology results, we will give the patient a phone call with them as opposed to her seeing them immediately through MyChart.    Zulay Larson DO  General Surgeon  [facility]  Breast [place] - [address] [address]  Office: [phone]  Employed by - [facility]

## 2025-06-18 NOTE — OP NOTE
Preoperative Diagnosis:  Right breast cancer    Postoperative Diagnosis:  Right breast cancer    Operating Surgeon:  Karen Nazario MD    First Assistant:  SONU Duvall, Trisha assisted in retraction and suturing to decrease and facilitate intraoperative time.  No residents were available.    Indications for procedure:  64 y/o F here for Right sided mastectomy, does need the nipple removed.  We discussed the option of implant reconstruction and a lift on the left side    Procedure:  1:  right reconstruction with implant  2:  left mastopexy  3:  right breast dermal flap 21cm x 13 cm (273cm2)    Procedure:  Patient was taken to the operating room, after and adequate level of anesthesia was obtained, patient was prepped and draped syeda  sterile fashion.  She under went a right side mastectomy with Dr Larson.  We started on the left side the excess skin was de-epithelized.  The skin flaps elevated, the lateral aspect was detached and a flap was created that was sewn in laterall with 2-0 vicyrl.  This was anchored to the chest wall.  The skin was re-draped and closed with 3-0 pds deep dermal, 3-0 stratafix along the inframammary fold, areaola and vertical limb closed with 3-0 monocryl deep and 4-0 subcuticular.  The right side was addressed, the dermal flap was created with de-epithelizing the lower skin it was detached laterally and anchored along the lateral border of hte pec muscle to hold the implants.   Breast weight 296gm  We opted for ssm -295.  The pocket was irrigated out with antibiotic irrigation. The implant was placed under the dermal flap that was used in replacement of the alloderm.  The skin was re-draped over the implant/dermal flap.  15 round drain placed.  he skin was re-draped and closed with 3-0 pds deep dermal, 3-0 stratafix along the inframammary fold, vertical limb closed with 3-0 monocryl deep and 4-0 subcuticular.      Prineo was placed over the incision followed by soft dressing and a  aayush.  Patient was awaken and taken to recovery room.    EBL:  20    Drains and Packs are correct:

## 2025-06-18 NOTE — ANESTHESIA PROCEDURE NOTES
Airway       Patient location during procedure: OR       Procedure Start/Stop Times: 6/18/2025 1:09 PM  Staff -        Other Anesthesia Staff: Mariah Brito       Performed By: SRNAIndications and Patient Condition       Indications for airway management: moe-procedural       Induction type:intravenous       Mask difficulty assessment: 0 - not attempted    Final Airway Details       Final airway type: endotracheal airway       Successful airway: ETT - single  Endotracheal Airway Details        ETT size (mm): 7.0       Cuffed: yes       Cuff volume (mL): 7       Successful intubation technique: direct laryngoscopy       DL Blade Type: Jara 2       Grade View of Cords: 1       Adjucts: stylet       Position: Center       Measured from: gums/teeth       Secured at (cm): 23       Bite block used: None    Post intubation assessment        Placement verified by: capnometry, equal breath sounds and chest rise        Number of attempts at approach: 1       Number of other approaches attempted: 0       Secured with: tape       Ease of procedure: easy       Dentition: Intact    Medication(s) Administered   Medication Administration Time: 6/18/2025 1:09 PM

## 2025-06-18 NOTE — ANESTHESIA CARE TRANSFER NOTE
Patient: Dany Diggs    Procedure: Procedure(s):  SKIN-SPARING MASTECTOMY  WITH SENTINEL LYMPH NODE BIOPSY  RECONSTRUCTION BREAST RIGHT WITH IMPLANT INSERTION,  LEFT MASTOPEXY FOR SYMMETRY       Diagnosis: Invasive ductal carcinoma of right breast (H) [C50.911]  Status post bilateral mastectomy [Z90.13]  Personal history of malignant neoplasm of breast [Z85.3]  Diagnosis Additional Information: No value filed.    Anesthesia Type:   General     Note:    Oropharynx: oropharynx clear of all foreign objects  Level of Consciousness: awake  Oxygen Supplementation: face mask  Level of Supplemental Oxygen (L/min / FiO2): 6  Independent Airway: airway patency satisfactory and stable  Dentition: dentition unchanged  Vital Signs Stable: post-procedure vital signs reviewed and stable  Report to RN Given: handoff report given  Patient transferred to: PACU    Handoff Report: Identifed the Patient, Identified the Reponsible Provider, Reviewed the pertinent medical history, Discussed the surgical course, Reviewed Intra-OP anesthesia mangement and issues during anesthesia, Set expectations for post-procedure period and Allowed opportunity for questions and acknowledgement of understanding      Vitals:  Vitals Value Taken Time   /68 06/18/25 14:57   Temp     Pulse 68 06/18/25 14:58   Resp 18 06/18/25 14:58   SpO2 100 % 06/18/25 14:58   Vitals shown include unfiled device data.    Electronically Signed By: CLIFF Evans CRNA  June 18, 2025  2:59 PM

## 2025-06-18 NOTE — ANESTHESIA PREPROCEDURE EVALUATION
"Anesthesia Pre-Procedure Evaluation    Patient: Dany Diggs   MRN: [id number] : 1960          Procedure : Procedure(s):  SKIN-SPARING MASTECTOMY  WITH SENTINEL LYMPH NODE BIOPSY  RECONSTRUCTION BREAST RIGHT WITH IMPLANT INSERTION,  LEFT MASTOPEXY FOR SYMMETRY         Past Medical History:   Diagnosis Date    Back pain     Breast cancer (H)     right    Gastroesophageal reflux disease     HLD (hyperlipidemia)     Hypertension     Rosacea     Seborrheic keratosis       Past Surgical History:   Procedure Laterality Date    DENTAL SURGERY      Fallopian tube and ovary removal      NASAL FRACTURE SURGERY        Allergies   Allergen Reactions    Codeine Unknown    Morphine     Sulfamethoxazole-Trimethoprim Rash      Social History     Tobacco Use    Smoking status: Former     Types: Cigarettes     Passive exposure: Never    Smokeless tobacco: Never   Substance Use Topics    Alcohol use: Yes     Alcohol/week: 7.0 - 14.0 standard drinks of alcohol     Types: 7 - 14 Standard drinks or equivalent per week      Wt Readings from Last 1 Encounters:   25 65.3 kg (143 lb 14.4 oz)        Anesthesia Evaluation            ROS/MED HX  ENT/Pulmonary:       Neurologic:       Cardiovascular:     (+)  hypertension- -   -  - -                                      METS/Exercise Tolerance:     Hematologic:       Musculoskeletal:       GI/Hepatic:     (+) GERD,                   Renal/Genitourinary:       Endo:       Psychiatric/Substance Use:       Infectious Disease:       Malignancy:       Other:              Physical Exam  Airway  Mallampati: II  TM distance: >3 FB  Neck ROM: full  Mouth opening: >= 4 cm    Cardiovascular - normal exam   Dental     Pulmonary - normal exam      Neurological - normal exam  She appears awake, alert and oriented x3.    Other Findings       OUTSIDE LABS:  CBC: No results found for: \"WBC\", \"HGB\", \"HCT\", \"PLT\"  BMP:   Lab Results   Component Value Date     (L) 2025     " "(L) 03/03/2025    POTASSIUM 3.9 05/30/2025    POTASSIUM 3.8 03/03/2025    CHLORIDE 97 (L) 05/30/2025    CHLORIDE 95 (L) 03/03/2025    CO2 26 05/30/2025    CO2 25 03/03/2025    BUN 10.0 05/30/2025    BUN 15.1 03/03/2025    CR 0.76 05/30/2025    CR 0.70 03/03/2025    GLC 90 05/30/2025     (H) 03/03/2025     COAGS: No results found for: \"PTT\", \"INR\", \"FIBR\"  POC: No results found for: \"BGM\", \"HCG\", \"HCGS\"  HEPATIC:   Lab Results   Component Value Date    ALBUMIN 4.4 06/06/2025    PROTTOTAL 7.2 06/06/2025    ALT 19 06/06/2025    AST 22 06/06/2025    ALKPHOS 59 06/06/2025    BILITOTAL 0.5 06/06/2025     OTHER:   Lab Results   Component Value Date    ROLANDO 9.8 05/30/2025    PHOS 2.9 10/16/2023       Anesthesia Plan    ASA Status:  2       Anesthesia Type: General.  Airway: oral.  Induction: intravenous.  Maintenance: Balanced.        Consents    Anesthesia Plan(s) and associated risks, benefits, and realistic alternatives discussed. Questions answered and patient/representative(s) expressed understanding.     - Discussed: CRNA     - Discussed with:  Patient        - Pt is DNR/DNI Status: no DNR          Postoperative Care    Pain management: multimodal analgesia.     Comments:                   Damián Harris MD    I have reviewed the pertinent notes and labs in the chart from the past 30 days and (re)examined the patient.  Any updates or changes from those notes are reflected in this note.    Clinically Significant Risk Factors Present on Admission                 # Drug Induced Platelet Defect: home medication list includes an antiplatelet medication   # Hypertension: Home medication list includes antihypertensive(s)                            "

## 2025-06-18 NOTE — ANESTHESIA POSTPROCEDURE EVALUATION
Patient: Dany Diggs    Procedure: Procedure(s):  SKIN-SPARING MASTECTOMY  WITH SENTINEL LYMPH NODE BIOPSY  RECONSTRUCTION BREAST RIGHT WITH IMPLANT INSERTION,  LEFT MASTOPEXY FOR SYMMETRY       Anesthesia Type:  General    Note:  Disposition: Outpatient   Postop Pain Control: Uneventful            Sign Out: Well controlled pain   PONV: No   Neuro/Psych: Uneventful            Sign Out: Acceptable/Baseline neuro status   Airway/Respiratory: Uneventful            Sign Out: Acceptable/Baseline resp. status   CV/Hemodynamics: Uneventful            Sign Out: Acceptable CV status; No obvious hypovolemia; No obvious fluid overload   Other NRE: NONE   DID A NON-ROUTINE EVENT OCCUR? No           Last vitals:  Vitals Value Taken Time   /87 06/18/25 15:49   Temp 35.8  C (96.5  F) 06/18/25 15:45   Pulse 70 06/18/25 15:56   Resp 60 06/18/25 15:55   SpO2 100 % 06/18/25 15:56   Vitals shown include unfiled device data.    Electronically Signed By: Damián Harris MD  June 18, 2025  5:10 PM

## 2025-06-24 LAB
PATH REPORT.COMMENTS IMP SPEC: ABNORMAL
PATH REPORT.COMMENTS IMP SPEC: YES
PATH REPORT.FINAL DX SPEC: ABNORMAL
PATH REPORT.GROSS SPEC: ABNORMAL
PATH REPORT.MICROSCOPIC SPEC OTHER STN: ABNORMAL
PATH REPORT.MICROSCOPIC SPEC OTHER STN: ABNORMAL
PATH REPORT.RELEVANT HX SPEC: ABNORMAL
PATHOLOGY SYNOPTIC REPORT: ABNORMAL
PHOTO IMAGE: ABNORMAL

## 2025-06-24 PROCEDURE — 88305 TISSUE EXAM BY PATHOLOGIST: CPT | Mod: 26 | Performed by: PATHOLOGY

## 2025-06-24 PROCEDURE — 88307 TISSUE EXAM BY PATHOLOGIST: CPT | Mod: 26 | Performed by: PATHOLOGY

## 2025-06-24 PROCEDURE — 88342 IMHCHEM/IMCYTCHM 1ST ANTB: CPT | Mod: 26 | Performed by: PATHOLOGY

## 2025-06-25 ENCOUNTER — CARE COORDINATION (OUTPATIENT)
Dept: SURGERY | Facility: CLINIC | Age: 65
End: 2025-06-25
Payer: COMMERCIAL

## 2025-06-30 ENCOUNTER — TELEPHONE (OUTPATIENT)
Dept: SURGERY | Facility: CLINIC | Age: 65
End: 2025-06-30
Payer: COMMERCIAL

## 2025-06-30 NOTE — TELEPHONE ENCOUNTER
Dany called, said this morning, she was texting her friend and couldn't get her brain to work to come up with words to send. Said she briefly felt confused and not herself.She called her primary md and talked to her triage RN who also wanted her to call Dr. Larson to let her know.  Dr. Larson notified. Told patient if this happens again she needs to go to the ER or call 911 if she is alone. Told her she should also make an appointment to see her primary to review the symptoms she had. Support provided, invited calls.

## 2025-07-01 ENCOUNTER — CARE COORDINATION (OUTPATIENT)
Dept: SURGERY | Facility: CLINIC | Age: 65
End: 2025-07-01
Payer: COMMERCIAL

## 2025-07-01 NOTE — PROGRESS NOTES
ES calling, asking to clarify which lesion Dr. Larson would like tested for Oncotype. Per Dr. Larson, she would like the largest (1.7 cm ) tumor tested.  Returned ES call, gave them this information.

## 2025-07-01 NOTE — PROGRESS NOTES
History:  Dany Diggs is s/p right mastectomy with reconstruction and left mastopexy on June 18.  She is physically doing well.  The right drain was removed last week.  She is happy with how the recovery is going and the cosmetic outcome.    Physical exam:  BREAST: Prineo over incisions.  Small amount of ecchymosis around the lower half of each breast.  No signs of infection or swelling.    Pathology:  A.  Breast, left skin lesion, excisional biopsy:  - Seborrheic keratosis.     B.  Breast, right, mastectomy:   -INVASIVE CARCINOMA OF NO SPECIAL TYPE (DUCTAL).  -[place] Grade:  2  (Tubule score = 3, Nuclear score = 2, Mitotic score = 1, total score= 6/9).  -Ductal Carcinoma in situ (DCIS): Extensive DCIS, grade 2, solid and papillary.  -Multiple masses: #1,0.4 x 0.2 x 0.2 cm.  #2, 1.7 x 1.1 x 0.7 cm.  #3, 1.3 x 1.0 x 0.9 cm.  -Margins of invasive carcinoma: Negative. Nearest, posterior and anterior-superior, 2 mm.  -Margins of DCIS: POSITIVE.  DCIS present at the junction of the anterior-superior and anterior-inferior margins (block B12).  Second nearest, posterior margin, 0.5 mm (block B14).  -Breast biomarkers ([facility], biopsy# [id number]):        -ESTROGEN RECEPTOR (ER): Positive.       -PROGESTERONE RECEPTOR (UT): Positive.       -HER2(IHC): Equivocal (2+).       -HER2 FISH: Negative.       -Ki-67: 22%  -Additional findings: Two biopsy sites/cavities identified (corresponding with masses #2 and #3).  Focus of atypical ductal hyperplasia in nipple lactiferous duct.  -Best tumor block for ancillary studies: B3.  -See tumor synoptic report.     C.  Lymph node, right sentinel, excisional biopsy:   - Multiple (4) benign lymph nodes, negative for metastatic carcinoma (0/4).  - Aggregate of benign fibroadipose and peripheral nerve tissue.    ASSESSMENT:  Dany Diggs is s/p right mastectomy for a multifocal invasive ductal carcinoma and DCIS    - Grade II, mT1, N0, ER/UT+, HER2-  -->  pathologic  "prognostic stage I    - Positive DCIS margin in an \"extensive\" area of DCIS    PLAN:  Pathology was discussed  Discussed Oncotype, which has been ordered, with expected release date of July 12  Referrals were placed for medical and radiation oncology  Continue with yearly mammograms on the left  Physical activity restrictions per Dr. Nazario  150 minutes of aerobic exercise/week with 2 days of strength training is recommended     - This can improve survival and decrease recurrence risk  Return to the breast clinic in 1 year, or earlier as needed    Zulay Larson DO  General Surgeon  [facility]  Breast [address] [address]  Office: [phone]  Employed by - [facility]    "

## 2025-07-02 ENCOUNTER — OFFICE VISIT (OUTPATIENT)
Dept: SURGERY | Facility: CLINIC | Age: 65
End: 2025-07-02
Attending: SURGERY
Payer: COMMERCIAL

## 2025-07-02 ENCOUNTER — PATIENT OUTREACH (OUTPATIENT)
Dept: ONCOLOGY | Facility: CLINIC | Age: 65
End: 2025-07-02

## 2025-07-02 DIAGNOSIS — C50.911 INVASIVE DUCTAL CARCINOMA OF RIGHT BREAST (H): Primary | ICD-10-CM

## 2025-07-02 PROCEDURE — G0463 HOSPITAL OUTPT CLINIC VISIT: HCPCS | Performed by: SURGERY

## 2025-07-02 PROCEDURE — 99024 POSTOP FOLLOW-UP VISIT: CPT | Performed by: SURGERY

## 2025-07-02 NOTE — LETTER
7/2/2025      Dany Diggs  [address] [place] [id number]      Dear Colleague,    Thank you for referring your patient, Dany Diggs, to the [facility] [place]. Please see a copy of my visit note below.    History:  Dany Diggs is s/p right mastectomy with reconstruction and left mastopexy on June 18.  She is physically doing well.  The right drain was removed last week.  She is happy with how the recovery is going and the cosmetic outcome.    Physical exam:  BREAST: Prineo over incisions.  Small amount of ecchymosis around the lower half of each breast.  No signs of infection or swelling.    Pathology:  A.  Breast, left skin lesion, excisional biopsy:  - Seborrheic keratosis.     B.  Breast, right, mastectomy:   -INVASIVE CARCINOMA OF NO SPECIAL TYPE (DUCTAL).  -[place] Grade:  2  (Tubule score = 3, Nuclear score = 2, Mitotic score = 1, total score= 6/9).  -Ductal Carcinoma in situ (DCIS): Extensive DCIS, grade 2, solid and papillary.  -Multiple masses: #1,0.4 x 0.2 x 0.2 cm.  #2, 1.7 x 1.1 x 0.7 cm.  #3, 1.3 x 1.0 x 0.9 cm.  -Margins of invasive carcinoma: Negative. Nearest, posterior and anterior-superior, 2 mm.  -Margins of DCIS: POSITIVE.  DCIS present at the junction of the anterior-superior and anterior-inferior margins (block B12).  Second nearest, posterior margin, 0.5 mm (block B14).  -Breast biomarkers ([facility], biopsy# [id number]):        -ESTROGEN RECEPTOR (ER): Positive.       -PROGESTERONE RECEPTOR (ME): Positive.       -HER2(IHC): Equivocal (2+).       -HER2 FISH: Negative.       -Ki-67: 22%  -Additional findings: Two biopsy sites/cavities identified (corresponding with masses #2 and #3).  Focus of atypical ductal hyperplasia in nipple lactiferous duct.  -Best tumor block for ancillary studies: B3.  -See tumor synoptic report.     C.  Lymph node, right sentinel, excisional biopsy:   - Multiple (4) benign lymph nodes, negative for  "metastatic carcinoma (0/4).  - Aggregate of benign fibroadipose and peripheral nerve tissue.    ASSESSMENT:  Dany Diggs is s/p right mastectomy for a multifocal invasive ductal carcinoma and DCIS    - Grade II, mT1, N0, ER/MO+, HER2-  -->  pathologic prognostic stage I    - Positive DCIS margin in an \"extensive\" area of DCIS    PLAN:  Pathology was discussed  Discussed Oncotype, which has been ordered, with expected release date of July 12  Referrals were placed for medical and radiation oncology  Continue with yearly mammograms on the left  Physical activity restrictions per Dr. Nazario  150 minutes of aerobic exercise/week with 2 days of strength training is recommended     - This can improve survival and decrease recurrence risk  Return to the breast clinic in 1 year, or earlier as needed    Zulay Larson DO  General Surgeon  [facility]  Breast Center [address] [address]  Office: [phone]  Employed by - [employer]      Again, thank you for allowing me to participate in the care of your patient.        Sincerely,        Zulay Larson DO    Electronically signed"

## 2025-07-02 NOTE — NURSING NOTE
Dany presents to [facility] for a post op appointment with Dr. Larson.  She is accompanied by herself for appointment.  RN assessment and EMR update. She states she is doing well, minimal pain.  Reviewed ROM exercises with her.  Told her not to start them until ok'd by Dr. Nazario. Patient met with Dr. Larson.  See dictation for details of visit.  Oncotype ordered, results pending.  She will plan to be referred onto Medical Oncology and Radiation Oncology and will plan to follow up with Dr. Larson  in one year with uni mammogram.  Invited calls sooner if she has any questions.

## 2025-07-02 NOTE — PROGRESS NOTES
New Patient Oncology Nurse Navigator Note     Referring provider:     Zulay Larson DO        Referring Clinic/Organization: [facility]      Referred to (specialty:) Medical Oncology and Radiation Oncology     Requested provider (if applicable): MARIELOS Location: [place]      Date Referral Received: July 2, 2025     Evaluation for:  C50.911 (ICD-10-CM) - Invasive ductal carcinoma of right breast (H)      Clinical History (per Nurse review of records provided):      Patient see today by Dr. Larson for post-operative follow up for right mastectomy with reconstruction and left mastopexy on 6/18/25.    Pathology:  A.  Breast, left skin lesion, excisional biopsy:  - Seborrheic keratosis.     B.  Breast, right, mastectomy:   -INVASIVE CARCINOMA OF NO SPECIAL TYPE (DUCTAL).  -Linda Grade:  2  (Tubule score = 3, Nuclear score = 2, Mitotic score = 1, total score= 6/9).  -Ductal Carcinoma in situ (DCIS): Extensive DCIS, grade 2, solid and papillary.  -Multiple masses: #1,0.4 x 0.2 x 0.2 cm.  #2, 1.7 x 1.1 x 0.7 cm.  #3, 1.3 x 1.0 x 0.9 cm.  -Margins of invasive carcinoma: Negative. Nearest, posterior and anterior-superior, 2 mm.  -Margins of DCIS: POSITIVE.  DCIS present at the junction of the anterior-superior and anterior-inferior margins (block B12).  Second nearest, posterior margin, 0.5 mm (block B14).  -Breast biomarkers ([facility], biopsy# [id number]):        -ESTROGEN RECEPTOR (ER): Positive.       -PROGESTERONE RECEPTOR (NV): Positive.       -HER2(IHC): Equivocal (2+).       -HER2 FISH: Negative.       -Ki-67: 22%  -Additional findings: Two biopsy sites/cavities identified (corresponding with masses #2 and #3).  Focus of atypical ductal hyperplasia in nipple lactiferous duct.  -Best tumor block for ancillary studies: B3.  -See tumor synoptic report.     C.  Lymph node, right sentinel, excisional biopsy:   - Multiple (4) benign lymph nodes, negative for metastatic carcinoma (0/4).  -  Aggregate of benign fibroadipose and peripheral nerve tissue.        Records Location: Care Everywhere, Media, and See Bookmarked material     Records Needed:   Path reports-biopsy and surgery (as applicable)  Pathology reviews (as applicable)  Breast imaging for past 5 years  Systemic imaging (as applicable)  Med onc notes, rad notes, OP note, surgeons note (as applicable)  Genetic results (as applicable)  Echo or MUGA results (as applicable)  Radiation summary (as applicable)  Chemotherapy summary(as applicable)     Additional testing needed prior to consult:     Oncotype ordered and scheduled to be released on 7/12/25    Payor: [facility] / Plan: [employer] MEDICARE / Product Type: HMO /     July 2, 2025  Referrals received and reviewed.   Sent to NPS to schedule.     Janel ENRIQUEZN, RN, OCN  Oncology Nurse Navigator   [facility]  Cancer Care Service Line   New Patient Hem/Onc Scheduling / Referrals: [phone] (fax: [phone] )

## 2025-07-11 NOTE — PROGRESS NOTES
[facility] Radiation Oncology Consult Note     Patient: Dany Diggs  MRN: [id number]  Date of Service: 07/17/2025          Zulay Larson,   [address] [place] [address]       Dear Dr. Larson:    Thank you very much for referring this patient for consideration of radiotherapy. As you know Ms. Diggs is a 65 year old female with a diagnosis of ER/DC+ HER2- R breast cancer. She was seen today for consideration of post-mastectomy RT.     HISTORY OF PRESENT ILLNESS:   Ms. Diggs is a 65 year old female with pT1c(m) N0(sn) M0 ER/DC+ HER2- IDC of the R breast s/p mastectomy and SLNB with negative invasive margins but positive DCIS margins.      She initially presented with R breast lump      4/24/25 Bilateral diagnostic mammogram:  Breast parenchyma is heterogeneous.  Skin marker has been placed over region of clinical concern in the anterior right breast.  There is a 12 mm irregular mass in the subareolar breast.  Further evaluation by ultrasound is recommended and will be performed.  There is no abnormality in the remaining right breast.  The left breast is unremarkable.      4/24/25 R breast US:  Attention was directed to the lower outer right breast.  There is a 12 x 7 x 8 mm irregular lobulated mass with internal vascularity, correlating with mammographic findings.  There are 2 smaller lesions in the adjacent parenchyma measuring 4 and 5 mm respectively.  The axilla is negative.  (BI-RADS 4)      5/2/25 US-guided R breast biopsy:  A) RIGHT BREAST, 7:00, 1 CM FROM NIPPLE, ULTRASOUND-GUIDED CORE BIOPSY:   1. Invasive ductal carcinoma      a. [place] grade: II of III; Linda score: 6 of 9      b. Angio-lymphatic invasion: Absent      c. Associated DCIS: Present      d. Subtype: Solid and papillary with focal necrosis      e. Grade of DCIS: 1-2 of 3   2. Breast Ancillary Testing:        a. Hormone Receptors:             Estrogen receptor: Positive (96%, strong  staining)             Progesterone receptor: Positive (76%, moderate staining)       b. HER2 by IHC: Equivocal (2+ by manual morphometry)          HER2 by FISH: Negative             HER2/CEP17 ratio: 1.06             HER2 signals/cell: 1.94             CEP17 signals/cell: 1.82       c. Ki-67: 22%     B) RIGHT BREAST, 7:00, 3 CM FROM NIPPLE, ULTRASOUND-GUIDED CORE BIOPSY:   1. Invasive ductal carcinoma measuring 3 mm      a. Linda grade: II of III; [place] score: 6 of 9      b. Angio-lymphatic invasion: Absent      c. Associated DCIS: Indeterminate   2. Estrogen and progesterone receptor immunohistochemistry, and HER2      analysis are deferred to part A    Test(s) Performed:           Estrogen Receptor (ER) Status:    Positive (greater than 10% of cells demonstrate nuclear positivity)          Percentage of Cells with Nuclear Positivity:    96 %          Average Intensity of Staining:    Strong        Test Type:    Laboratory-developed test        Primary Antibody:    SP1      Test(s) Performed:           Progesterone Receptor (PgR) Status:    Positive          Percentage of Cells with Nuclear Positivity:    76 %          Average Intensity of Staining:    Moderate        Test Type:    Laboratory-developed test        Primary Antibody:    16      Test(s) Performed:           HER2 by Immunohistochemistry:    Equivocal (Score 2+)          Percentage of Cells with Uniform Intense Complete Membrane Stainin %        Test Type:    Laboratory-developed test        Primary Antibody:    4B5      Test(s) Performed:           HER2 by in situ Hybridization:    Negative (not amplified)        Number of Observers:    2        Number of Invasive Tumor Cells Counted:    25 cells        Method:    Dual probe assay          Average Number of HER2 Signals per Cell:    1.94          Average Number of CEP17 Signals per Cell:    1.82          HER2 / CEP17 Ratio:    1.06        Aneusomy:    Not identified         Heterogeneous Signals:    Not identified        Test Type:    Food and Drug Administration (FDA) cleared (test / vendor): [employer] HER2/Candelario      Test(s) Performed:    Ki-67        Ki-67 Percentage of Positive Nuclei:    22 %        Primary Antibody:    MIB1      Cold Ischemia and Fixation Times:    Meet requirements specified in latest version of the ASCO / CAP Guidelines      Testing Performed on Block Number(s):    A1       5/22/25 Genetic testing: negative      6/18/25 R mastectomy and SLNB with immediate implant reconstruction and dermal flap and L mastopexy:  A.  Breast, left skin lesion, excisional biopsy:  - Seborrheic keratosis.     B.  Breast, right, mastectomy:   -INVASIVE CARCINOMA OF NO SPECIAL TYPE (DUCTAL).  -Linda Grade:  2  (Tubule score = 3, Nuclear score = 2, Mitotic score = 1, total score= 6/9).  -Ductal Carcinoma in situ (DCIS): Extensive DCIS, grade 2, solid and papillary.  -Multiple masses: #1,0.4 x 0.2 x 0.2 cm.  #2, 1.7 x 1.1 x 0.7 cm.  #3, 1.3 x 1.0 x 0.9 cm.  -Margins of invasive carcinoma: Negative. Nearest, posterior and anterior-superior, 2 mm.  -Margins of DCIS: POSITIVE.  DCIS present at the junction of the anterior-superior and anterior-inferior margins (block B12).  Second nearest, posterior margin, 0.5 mm (block B14).  -Breast biomarkers ([facility], biopsy# [id number]):        -ESTROGEN RECEPTOR (ER): Positive.       -PROGESTERONE RECEPTOR (RI): Positive.       -HER2(IHC): Equivocal (2+).       -HER2 FISH: Negative.       -Ki-67: 22%  -Additional findings: Two biopsy sites/cavities identified (corresponding with masses #2 and #3).  Focus of atypical ductal hyperplasia in nipple lactiferous duct.  -Best tumor block for ancillary studies: B3.  -See tumor synoptic report.     C.  Lymph node, right sentinel, excisional biopsy:   - Multiple (4) benign lymph nodes, negative for metastatic carcinoma (0/4).  - Aggregate of benign fibroadipose and peripheral nerve  tissue.      Tumor Site  Clock position     7 o'clock   Histologic Type  Invasive carcinoma of no special type (ductal)   Histologic Grade (Linda Histologic Score)     Glandular (Acinar) / Tubular Differentiation  Score 3   Nuclear Pleomorphism  Score 2   Mitotic Rate  Score 1   Overall Grade  Grade 2 (scores of 6 or 7)   Tumor Size  Greatest dimension of largest invasive focus (Millimeters): 17 mm   Additional Dimension (Millimeters)  11 mm     7 mm   Tumor Focality  Multiple foci of invasive carcinoma   Sizes of Individual Foci in Millimeters (mm)  17 mm; 14 mm; 4 mm;   Ductal Carcinoma In Situ (DCIS)  Present     Positive for extensive intraductal component (EIC)   Architectural Patterns  Papillary     Solid   Nuclear Grade  Grade II (intermediate)   Necrosis  Not identified   Number of Blocks with DCIS  13   Number of Blocks Examined  22   Lobular Carcinoma In Situ (LCIS)  Not identified   Lymphatic and / or Vascular Invasion  Not identified   Dermal Lymphatic and / or Vascular Invasion  Not identified   Microcalcifications  Present in non-neoplastic tissue   Treatment Effect in the Breast  No known presurgical therapy   MARGINS   Margin Status for Invasive Carcinoma  All margins negative for invasive carcinoma   Distance from Invasive Carcinoma to Closest Margin  2 mm   Closest Margin(s) to Invasive Carcinoma  Posterior     Anterior-superior   Distance from Invasive Carcinoma to Other Margin(s)  All remaining margins are free by greater than 5 mm   Margin Status for DCIS  DCIS present at margin   Margin(s) Involved by DCIS  Anterior: At the junction of the anterior-superior and anterior-inferior margins, over 2 mm extent   Distance from DCIS to Posterior Margin  0.5 mm   Distance from DCIS to Other Margin(s)  All remaining margins are free by greater than 5 mm   REGIONAL LYMPH NODES   Regional Lymph Node Status  All regional lymph nodes negative for tumor   Total Number of Lymph Nodes Examined (sentinel  and non-sentinel)  4   Number of [place] Nodes Examined  4   pTNM CLASSIFICATION (AJCC 8th Edition)   Reporting of pT, pN, and (when applicable) pM categories is based on information available to the pathologist at the time the report is issued. As per the AJCC (Chapter 1, 8th Ed.) it is the managing physician's responsibility to establish the final pathologic stage based upon all pertinent information, including but potentially not limited to this pathology report.   pT Category  pT1c   T Suffix  (m)   pN Category  pN0   N Suffix  (sn)   ADDITIONAL FINDINGS   Additional Findings  Two biopsy sites/cavities identified (corresponding with masses #2 and #3).  Focus of atypical ductal hyperplasia in nipple lactiferous duct.   SPECIAL STUDIES        Estrogen Receptor (ER) Status  Positive (greater than 10% of cells demonstrate nuclear positivity)   Percentage of Cells with Nuclear Positivity  %        Progesterone Receptor (PgR) Status  Positive   Percentage of Cells with Nuclear Positivity  71-80%        HER2 (by immunohistochemistry)  Equivocal (Score 2+)   Percentage of Cells with Uniform Intense Complete Membrane Staining  Cannot be determined        HER2 (by in situ hybridization)  Negative (not amplified)        Ki-67 Percentage of Positive Nuclei  22 %   Testing Performed on Case Number  [id number] [facility]       6/18/25 Oncotype: 3      On exam today, Dany reports that she is overall doing well since the time of her recent surgery.  She reports that her surgical incisions remain little tender but she is otherwise not having significant pain.  She states that she is a little sore under her right arm in the region of her sentinel lymph node biopsy and states that the incision along the inferior aspect of the left breast is also a little more tender than other areas.  She has noticed a little surface irritation in this area which she attributes to wearing a compression bra.  She has not noticed other  areas of erythema around her incisions.  She has not noticed any drainage or swelling associated with the incisions.  She has been taking Advil mostly for back pain which has also helped somewhat with her breast tenderness.  No recent fevers, chills, nausea, vomiting.  The remainder of her review of systems is otherwise unremarkable.  She has good range of motion in her arms.  She just talked with medical oncology earlier today and found out her Oncotype was 3 and she was happy to hear she would not require chemotherapy.  She has no history of prior malignancy or prior radiotherapy.  No implantable devices.  No autoimmune diseases or connective tissue disorders.  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       CHEMOTHERAPY HISTORY: Concurrent Chemotherapy: No    RADIATION THERAPY HISTORY: Prior Radiation: No    IMPLANTED CARDIAC DEVICE: none     PREGNANCY: The patient is informed not to be pregnant during the radiation therapy.  She is post menopausal or had tubal ligation or her  had vasectomy.    Current Outpatient Medications   Medication Sig Dispense Refill    anastrozole (ARIMIDEX) 1 MG tablet Take 1 tablet (1 mg) by mouth daily. 90 tablet 3    atorvastatin (LIPITOR) 10 MG tablet Take 10 mg by mouth daily.      betamethasone dipropionate (DIPROSONE) 0.05 % external ointment Apply topically 2 times daily. (Patient not taking: Reported on 7/16/2025)      cyclobenzaprine (FLEXERIL) 5 MG tablet  (Patient not taking: Reported on 7/16/2025)      esomeprazole (NEXIUM) 20 MG DR capsule Take 20 mg by mouth every other day.      hydrochlorothiazide (MICROZIDE) 12.5 MG capsule Take 12.5 mg by mouth every morning.      ibuprofen (ADVIL/MOTRIN) 200 MG tablet Take 3 Tablets (600 mg) by mouth as needed for Pain.      lisinopril (ZESTRIL) 10 MG tablet Take 10 mg by mouth daily.      LORazepam (ATIVAN) 0.5 MG tablet Take 0.5 mg by mouth every 6 hours as needed for anxiety. Flying      metroNIDAZOLE  (METROCREAM) 0.75 % external cream Apply topically 2 times daily.      Multiple Vitamin (MULTIVITAMIN PO) Take 1 tablet by mouth daily.      valACYclovir (VALTREX) 500 MG tablet  (Patient not taking: Reported on 2025)       Past Medical History:   Diagnosis Date    Back pain     Breast cancer (H)     right    Gastroesophageal reflux disease     HLD (hyperlipidemia)     Hypertension     Rosacea     Seborrheic keratosis      Past Surgical History:   Procedure Laterality Date    BIOPSY NODE SENTINEL Right 2025    Procedure: WITH SENTINEL LYMPH NODE BIOPSY;  Surgeon: Zulay Larson DO;  Location: [facility] OR    DENTAL SURGERY      Fallopian tube and ovary removal  1980    MASTECTOMY SIMPLE Right 2025    Procedure: SKIN-SPARING MASTECTOMY;  Surgeon: Zulay Larson DO;  Location: [facility] OR    MASTOPEXY Left 2025    Procedure: LEFT MASTOPEXY FOR SYMMETRY;  Surgeon: Karen Nazario MD;  Location: [facility] OR    NASAL FRACTURE SURGERY      RECONSTRUCT BREAST, IMPLANT PROSTHESIS, COMBINED Right 2025    Procedure: RECONSTRUCTION BREAST RIGHT WITH IMPLANT INSERTION,;  Surgeon: Karen Nazario MD;  Location: [facility] OR     Allergies   Allergen Reactions    Codeine Unknown    Morphine     Sulfamethoxazole-Trimethoprim Rash     No family history on file.  Social History     Socioeconomic History    Marital status:      Spouse name: Not on file    Number of children: Not on file    Years of education: Not on file    Highest education level: Not on file   Occupational History    Not on file   Tobacco Use    Smoking status: Former     Current packs/day: 0.00     Types: Cigarettes     Quit date:      Years since quittin.5     Passive exposure: Never    Smokeless tobacco: Never   Vaping Use    Vaping status: Never Used   Substance and Sexual Activity    Alcohol use: Yes     Alcohol/week: 7.0 - 14.0 standard drinks of alcohol     Types: 7 - 14 Standard drinks or  equivalent per week     Comment: couple times a week - wine    Drug use: Never    Sexual activity: Not on file   Other Topics Concern    Not on file   Social History Narrative    Not on file     Social Drivers of Health     Financial Resource Strain: High Risk (1/1/2022)    Received from [facility]    Financial Resource Strain     Difficulty of Paying Living Expenses: Not on file     Difficulty of Paying Living Expenses: Not on file   Food Insecurity: Not on file   Transportation Needs: Not on file   Physical Activity: Not on file   Stress: Not on file   Social Connections: Unknown (1/1/2022)    Received from [facility]    Social Connections     Frequency of Communication with Friends and Family: Not on file   Interpersonal Safety: Low Risk  (6/18/2025)    Interpersonal Safety     Do you feel physically and emotionally safe where you currently live?: Yes     Within the past 12 months, have you been hit, slapped, kicked or otherwise physically hurt by someone?: No     Within the past 12 months, have you been humiliated or emotionally abused in other ways by your partner or ex-partner?: No   Housing Stability: Not on file        REVIEW OF SYMPTOMS:  A full 14-point review of systems was performed. Pertinent findings are noted in the HPI.    General  Constitutional  Constitutional (WDL): All constitutional elements are within defined limits  EENT  Eye Disorders  Eye Disorder (WDL): All eye disorder elements are within defined limits  Ear Disorders  Ear Disorder (WDL): All ear disorder elements are within defined limits  Respiratory  Respiratory  Respiratory (WDL): All respiratory elements are within defined limits  Cardiovascular  Cardiovascular  Cardiovascular (WDL): All cardiovascular elements are within defined limits  Gastrointestinal  Gastrointestinal  Gastrointestinal (WDL): All gastrointestinal elements are within defined  limits  Musculoskeletal  Musculoskeletal and Connective Tissue Disorders  Musculoskeletal & Connective (WDL): Exceptions to WDL (some low back pain, working up on ROM since breast surg.)  Arthralgia: Mild pain  Generalized Muscle Weakness: Symptomatic OR perceived by patient but not evident on physical exam  Integumentary  Integumentary  Integumentary (WDL): All integumentary elements are within defined limits  Neurological  Neurosensory  Neurosensory (WDL): All neurosensory elements are within defined limits  Genitourinary/Reproductive  Genitourinary  Genitourinary (WDL): All genitourinary elements are within defined limits  Lymphatic  Lymph System Disorders  Lymph (WDL): All lymph elements are within defined limits  Pain  Pain Score: Mild Pain (3)  AUA Assessment                                                              Accompanied by  Accompanied By: self only    ECOG Status: 0 - Independent    KPS Score: 90% Can perform normal activity, minor signs of disease    Pain Management Plan: Advil/tylenol prn    Recent Labs: No results found for this or any previous visit (from the past week).    Imaging: Imaging results 6 weeks:NM Lymphoscintigraphy Injection only  Result Date: 6/18/2025  INDICATION: Breast Carcinoma. Pre-operative identification of the right sentinel lymph node. PROCEDURE: Informed consent was obtained from the patient. The skin was prepped with ChloraPrep.  560 uCi Tc-99m Lymphoseek was injected subdermally along the upper outer aspect of the areolar margin. The patient tolerated this well.  The procedure was performed by CHAPO DING on 6/18/2025, at 11:48 AM.    IMPRESSION: [place] lymph node injection.       Pathology:   No results found for this or any previous visit (from the past 8760 hours).              Objective:        PHYSICAL EXAMINATION:    BP (!) 185/97   Pulse 77   Resp 16   SpO2 100%     Gen: Alert, in NAD  Eyes: PERRL, EOMI, sclera anicteric  HENT     Head: NC/AT     Ears: No  external auricular lesions     Nose/sinus: No rhinorrhea or epistaxis     Oropharynx: MMM, no visible oral lesions  Neck: Supple, full ROM, no LAD  Pulm: No wheezing, stridor or respiratory distress  CV: Well-perfused, no cyanosis, no pedal edema  Abdominal: Nondistended  Breasts: R skin sparing mastectomy with implant reconstruction and L mastopexy incisions are CDI. There is some mild erythema associated with inferior L breast incision centrally consistent with mild dermatitis. Incisions are CDI. She is mildly tender to palpation of L breast and R reconstruction. No adenopathy slava.  Rectal: Deferred  : Deferred  Musculoskeletal: Normal muscle bulk and tone  Skin: Normal color and turgor  Neurologic: CN II-XII grossly intact, normal gait and station  Psychiatric: Appropriate mood and affect      Intent of Therapy: Curative        Impression     Visit Dx:  (C50.311,  Z17.0) Malignant neoplasm of lower-inner quadrant of right breast of female, estrogen receptor positive (H)  (primary encounter diagnosis)    (C50.911) Invasive ductal carcinoma of right breast (H)       Cancer Staging   Malignant neoplasm of lower-inner quadrant of right breast of female, estrogen receptor positive (H)  Staging form: Breast, AJCC 8th Edition  - Pathologic stage from 6/18/2025: Stage IA (pT1c, pN0, cM0, G2, ER+, MI+, HER2-) - Unsigned      Assessment & Plan:   65 year old female with pT1c(m) N0(sn) M0 ER/MI+ HER2- IDC of the R breast s/p mastectomy and SLNB with negative invasive margins but positive DCIS margins.    I had a detailed discussion with Dany regarding the role of radiotherapy in the overall management of her early-stage ER/MI positive HER2 negative invasive ductal carcinoma of the right breast.  I discussed that she does not have pathologic features that suggest that she would benefit from adjuvant radiotherapy to the right chest wall aside from a positive DCIS margin.  I discussed that the optimal management of a  positive DCIS margin following mastectomy in the context of associated invasive breast cancer is controversial.  Ideally, she could undergo reexcision to negative margins.  However, given her immediate reconstruction, it is not likely that the region of the positive margin can be readily identified.  There is very limited data to guide decision making related to adjuvant RT in this situation.  There is some single institution retrospective data from multiple sources examining the risk of recurrence associated with a positive margin following mastectomy for pure DCIS.  In this situation, the risk of recurrence overall appears to be very low and there appears to be minimal benefit for the addition of radiotherapy.  It is unclear how the presence of invasive disease associated with DCIS would influence these outcomes but I assume her risk of recurrence would be similar to that of the patients from the studies.  Given that the extent of the involved margin was fairly small at 2 mm and her DCIS was of intermediate nuclear grade and not associated with any necrosis, I suspect her risk of recurrence without radiotherapy would be fairly low overall.  The benefit of radiotherapy in this context is also somewhat unclear although it is likely to provide some benefit in terms of local control.  I discussed that this local control benefit is weighed against her risk of toxicity, specifically her risk of implant complications given immediate reconstruction.  Overall, I think the best course of action is likely omission of radiotherapy with close clinical surveillance.  On the other hand, it would certainly offer radiotherapy with the goal of improved local control if this is her preference.  I discussed that there is no evidence that radiotherapy would be expected to improve survival outcomes.  I reviewed the logistics as well as expected acute and potential late toxicities of chest wall radiotherapy in detail with the patient.   I discussed that treatment would likely consist of 3 weeks of daily radiotherapy treatments directed at the right chest wall.  She does not have indications for treatment of her lymph nodes and I would therefore not plan to include lymph node regions.  Toxicities include but are not limited to fatigue, skin reaction, soft tissue fibrosis, cosmetic changes, implant complications including capsular contraction or implant loss, hyperpigmentation, lymphedema, pneumonitis, increased risk of cardiac events, secondary malignancy, etc.  She had a number of questions which were answered to the best of my ability.  Upon conclusion of our discussion, she remains undecided about whether she would like to proceed with adjuvant radiotherapy or not.  I offered to discuss her case with the rest of her care team to determine whether there are any other features of her case that are not evident from her available history.  She will also plan to think about her preferences as well.  Up to this point, she had been more or less assuming that she would require radiotherapy and was happy to hear that she may not require this but remains somewhat hesitant to proceed without adjuvant radiotherapy at this time.  I will plan to discuss her case with her surgical and medical on oncology team and relay the results of that discussion to her.  In the event that she opts for adjuvant radiotherapy, my tentative plan would be to deliver 4005 cGy in 15 daily fractions to the right chest wall.        Total time of this visit, including time spent face-to-face with patient and or via video/audio, and also in preparing for today's visit for MDM and documentation. Medical decision-making included consideration and possible diagnoses, management options, complex record review, review of diagnostic tests and information, consideration and discussion of significant complications based on comorbidities, discussion with providers involved in the care of the  patient.     85 Minutes spent.     This note has been dictated using voice recognition software and as a result may contain minor grammatical errors and unintended word substitutions.         Sincerely,      Adair Rodrigues MD  Department of Radiation Oncology   [facility] Radiation Oncology  Tel: [phone]  Page: [phone]    [facility]  [address] [place] [address]     [place] [id number] [facility]    [place] [address]    CC:  Patient Care Team:  Nataly Ortiz PA-C as PCP - General (Physician Assistant)  Zulay Larson DO as Physician (Surgery)  Zulay Larson DO as Assigned Surgical Provider  Deangelo Becerra MD as MD (Hematology)  Adair Rodrigues MD as MD (Radiation Oncology)  Zulay Galvan, RN as Specialty Care Coordinator (Hematology & Oncology)

## 2025-07-14 ENCOUNTER — CARE COORDINATION (OUTPATIENT)
Dept: SURGERY | Facility: CLINIC | Age: 65
End: 2025-07-14
Payer: COMMERCIAL

## 2025-07-14 NOTE — PROGRESS NOTES
Talked with ES Rep who wanted to once again confirm the tumor (due to multi focal dx) that Dr. Larson wanted tested.  Told them she wished for the 1.7 cm tumor to be tested as it is the largest one.  Per ES Rep, they did not receive the block from pathology to start the testing until 7-10-25 and testing will not begin until 7-15-25 with a 10-14 day turnaround.

## 2025-07-16 ENCOUNTER — ONCOLOGY VISIT (OUTPATIENT)
Dept: ONCOLOGY | Facility: HOSPITAL | Age: 65
End: 2025-07-16
Attending: SURGERY
Payer: COMMERCIAL

## 2025-07-16 ENCOUNTER — PATIENT OUTREACH (OUTPATIENT)
Dept: ONCOLOGY | Facility: HOSPITAL | Age: 65
End: 2025-07-16

## 2025-07-16 VITALS
HEART RATE: 76 BPM | DIASTOLIC BLOOD PRESSURE: 89 MMHG | SYSTOLIC BLOOD PRESSURE: 182 MMHG | BODY MASS INDEX: 25.27 KG/M2 | OXYGEN SATURATION: 99 % | RESPIRATION RATE: 18 BRPM | WEIGHT: 148 LBS | TEMPERATURE: 97.2 F | HEIGHT: 64 IN

## 2025-07-16 DIAGNOSIS — Z17.0 MALIGNANT NEOPLASM OF LOWER-INNER QUADRANT OF RIGHT BREAST OF FEMALE, ESTROGEN RECEPTOR POSITIVE (H): Primary | ICD-10-CM

## 2025-07-16 DIAGNOSIS — C50.311 MALIGNANT NEOPLASM OF LOWER-INNER QUADRANT OF RIGHT BREAST OF FEMALE, ESTROGEN RECEPTOR POSITIVE (H): Primary | ICD-10-CM

## 2025-07-16 PROCEDURE — G0463 HOSPITAL OUTPT CLINIC VISIT: HCPCS | Performed by: INTERNAL MEDICINE

## 2025-07-16 PROCEDURE — 99204 OFFICE O/P NEW MOD 45 MIN: CPT | Performed by: INTERNAL MEDICINE

## 2025-07-16 PROCEDURE — G2211 COMPLEX E/M VISIT ADD ON: HCPCS | Performed by: INTERNAL MEDICINE

## 2025-07-16 RX ORDER — IBUPROFEN 200 MG
TABLET ORAL
COMMUNITY

## 2025-07-16 ASSESSMENT — PAIN SCALES - GENERAL: PAINLEVEL_OUTOF10: MILD PAIN (2)

## 2025-07-16 NOTE — LETTER
"7/16/2025      Dany Diggs  [address] [facility] [address] [place] [id number]      Dear Colleague,    Thank you for referring your patient, Dany Diggs, to the [facility]. Please see a copy of my visit note below.    Oncology Rooming Note    July 16, 2025 1:06 PM   Dany Diggs is a 65 year old female who presents for:    Chief Complaint   Patient presents with     Oncology Clinic Visit     New patient consult     Initial Vitals: BP (!) 182/89 (BP Location: Left arm, Patient Position: Sitting, Cuff Size: Adult Regular)   Pulse 76   Temp 97.2  F (36.2  C) (Tympanic)   Resp 18   Ht 1.626 m (5' 4\")   Wt 67.1 kg (148 lb)   SpO2 99%   BMI 25.40 kg/m   Estimated body mass index is 25.4 kg/m  as calculated from the following:    Height as of this encounter: 1.626 m (5' 4\").    Weight as of this encounter: 67.1 kg (148 lb). Body surface area is 1.74 meters squared.  Mild Pain (2) Comment: Data Unavailable   No LMP recorded. Patient is postmenopausal.  Allergies reviewed: Yes  Medications reviewed: Yes    Medications: Medication refills not needed today.  Pharmacy name entered into [employer]: [id number] - [place], [place] - [address]  AT [facility] [address]    PHQ9:  Did this patient require a PHQ9?: No      Clinical concerns:       ABBY MCKOY CMA              [facility] Hematology and Oncology Consult Note    Patient: Dany Diggs  MRN: [id number]  Date of Service: Jul 16, 2025           Reason for consultation      Problem List Items Addressed This Visit          Oncology    Malignant neoplasm of lower-inner quadrant of right breast of female, estrogen receptor positive (H) - Primary    Relevant Orders    Comprehensive metabolic panel         Assessment / number of problems addressed      Very pleasant 65 years old postmenopausal woman with right-sided invasive ductal carcinoma with multiple masses.  The largest 17 mm in " size.  The other 2 ones were 11 mm and 7 mm respectively.  ER positive ID positive HER2/heydi 2+ on immunohistochemistry but FISH negative.  She is status postlumpectomy.  Margins negative for invasive ductal carcinoma.  Extensive DCIS noted in the surgical specimen.  Margins were very close to positive.  No family history of breast cancer.  Good general health.    Plan and medical decision making      Reviewed notes from Dr. Singh Tinoco personally reviewed the images of the mammogram showing the clip placed.  Reviewed labs including CBC CMP and pathology report..  Ordered Oncotype test.  Discussed with the patient the management of Oncotype test and its implications.  Independent historian account obtained from the .    Await Oncotype result.  If the Oncotype is less than 25 she will not need adjuvant chemotherapy.  She can then be treated with radiation therapy followed by adjuvant endocrine therapy afterwards.  We would start her on anastrozole.  If her Oncotype comes back more than or equal to 25 then she will be a candidate for adjuvant chemotherapy.  We will obviously talk to her about it and then depending upon her decision proceed.  She will need bone density measurements at some point.  Continue good diet and exercise.  Plan to follow-up in 2 months timeframe with the anticipation that she will have a low and Oncotype score.  We will call in a prescription of an aromatase inhibitor and then she will see me back after having taken it for about a month or so.  The longitudinal plan of care for the diagnosis(es)/condition(s) as documented were addressed during this visit. Due to the added complexity in care, I will continue to support Dany in the subsequent management and with ongoing continuity of care.     Clinical/pathological stage      Cancer Staging   Malignant neoplasm of lower-inner quadrant of right breast of female, estrogen receptor positive (H)  Staging form: Breast, AJCC 8th Edition  -  Pathologic stage from 6/18/2025: Stage IA (pT1c, pN0, cM0, G2, ER+, AL+, HER2-) - Unsigned      History of present illness      Ms. Dany Diggs is a 65 year old postmenopausal woman with a new diagnosis of invasive ductal carcinoma right breast.  She actually felt it herself and April 2025.  Went in for a mammogram which was suspicious.  She had multiple masses noted in her right breast.  The original mass that she felt was located in the lower inner quadrant.  She then underwent biopsy which was invasive ductal carcinoma ER positive AL positive HER2/heydi negative by FISH with 2+ on immunohistochemistry.  She was then seen by Dr. Singh Tinoco.  Underwent mastectomy because of the presence of multiple masses.  The surgery was performed on 18 June 2025.  Pathology showed 3 masses with the largest one 17 mm in size the smaller 7 mm in size.  There was extensive amount of DCIS present.  The margins for DCIS were positive.    4 sentinel lymph nodes were removed and they were negative.    She did have reconstructive surgery done.  Comes in today for follow-up to discuss her adjuvant therapy options.  She is accompanied by her significant other.    She did have genetic testing done and that was negative.  She does not have any family history.  She did have 1 tubal pregnancy for which she underwent oophorectomy and salpingectomy.  Did have significant oral contraceptive use for over 15 years.  She never became pregnant after that.  No prior breast biopsies.    Detailed review of systems      A review of systems was obtained.  Positive findings noted in the history.  Rest of the review of system is otherwise negative.      Past medical/surgical/social/family history        Past Medical History:   Diagnosis Date     Back pain      Breast cancer (H)     right     Gastroesophageal reflux disease      HLD (hyperlipidemia)      Hypertension      Rosacea      Seborrheic keratosis      Past Surgical History:   Procedure  Laterality Date     BIOPSY NODE SENTINEL Right 2025    Procedure: WITH SENTINEL LYMPH NODE BIOPSY;  Surgeon: Zulay Larson DO;  Location: [facility] OR     DENTAL SURGERY       Fallopian tube and ovary removal  1980     MASTECTOMY SIMPLE Right 2025    Procedure: SKIN-SPARING MASTECTOMY;  Surgeon: Zulay Larson DO;  Location: [facility] OR     MASTOPEXY Left 2025    Procedure: LEFT MASTOPEXY FOR SYMMETRY;  Surgeon: Karen Nazario MD;  Location: [facility] OR     NASAL FRACTURE SURGERY       RECONSTRUCT BREAST, IMPLANT PROSTHESIS, COMBINED Right 2025    Procedure: RECONSTRUCTION BREAST RIGHT WITH IMPLANT INSERTION,;  Surgeon: Karen Nazario MD;  Location: [facility] OR     No family history on file.  Social History     Socioeconomic History     Marital status:      Spouse name: None     Number of children: None     Years of education: None     Highest education level: None   Tobacco Use     Smoking status: Former     Current packs/day: 0.00     Types: Cigarettes     Quit date:      Years since quittin.5     Passive exposure: Never     Smokeless tobacco: Never   Vaping Use     Vaping status: Never Used   Substance and Sexual Activity     Alcohol use: Yes     Alcohol/week: 7.0 - 14.0 standard drinks of alcohol     Types: 7 - 14 Standard drinks or equivalent per week     Comment: couple times a week - wine     Drug use: Never     Social Drivers of Health      Received from [facility]    Financial Resource Strain    Received from [facility]    Social Connections   Interpersonal Safety: Low Risk  (2025)    Interpersonal Safety      Do you feel physically and emotionally safe where you currently live?: Yes      Within the past 12 months, have you been hit, slapped, kicked or otherwise physically hurt by someone?: No      Within the past 12 months, have you been humiliated or emotionally  "abused in other ways by your partner or ex-partner?: No           Allergies      Allergies   Allergen Reactions     Codeine Unknown     Morphine      Sulfamethoxazole-Trimethoprim Rash         Physical exam        BP (!) 182/89 (BP Location: Left arm, Patient Position: Sitting, Cuff Size: Adult Regular)   Pulse 76   Temp 97.2  F (36.2  C) (Tympanic)   Resp 18   Ht 1.626 m (5' 4\")   Wt 67.1 kg (148 lb)   SpO2 99%   BMI 25.40 kg/m      GENERAL: No acute distress. Cooperative in conversation.   HEENT:  Pupils are equal, round and reactive. Oral mucosa is clean and intact. No ulcerations or mucositis noted. No bleeding noted.  RESP:Chest symmetric lungs are clear bilaterally per auscultation. Regular respiratory rate. No wheezes or rhonchi.  CV: Normal S1 S2 Regular, rate and rhythm.     ABD: Nondistended, soft, nontender. Positive bowel sounds. No organomegaly.   EXTREMITIES: No lower extremity edema.   NEURO: Non- focal. Alert and oriented x3.  Cranial nerves appear intact.  PSYCH: Within normal limits. No depression or anxiety.  SKIN: Warm dry intact.       Laboratory data      No results found for this or any previous visit (from the past week).    Imaging results        NM Lymphoscintigraphy Injection only  Result Date: 6/18/2025  INDICATION: Breast Carcinoma. Pre-operative identification of the right sentinel lymph node. PROCEDURE: Informed consent was obtained from the patient. The skin was prepped with ChloraPrep.  560 uCi Tc-99m Lymphoseek was injected subdermally along the upper outer aspect of the areolar margin. The patient tolerated this well.  The procedure was performed by CHAPO DING on 6/18/2025, at 11:48 AM.    IMPRESSION: [place] lymph node injection.           This note has been dictated using voice recognition software. Any grammatical or context distortions are unintentional and inherent to the software      Signed by: Deangelo Becerra MD      Again, thank you for allowing me to " participate in the care of your patient.        Sincerely,        Deangelo Becerra MD    Electronically signed

## 2025-07-16 NOTE — PROGRESS NOTES
[facility]: Cancer Care                                                                                            Situation: Patient chart reviewed by care coordinator.    Background: Patient with a newly diagnosed right breast invasive ductal carcinoma.  She had multiple masses with the largest being 17 mm in size.  This is ER/ND positive and HER2/heydi negative by FISH.  Right breast mastectomy on 6/18/2025.  4 lymph nodes were removed and none of them showed cancer.  Oncotype was sent for on 6/25/2025.    Assessment: Patient comes in today for consultation with Dr Becerra.  Since the Oncotype results are still pending, he talked with the patient about next steps with her her Oncotype came back at less than 25 or if it came back above or equal to 25.    Oncotype <25 = no adjuvant chemotherapy, treated with radiation followed by adjuvant endocrine therapy with anastrozole    Oncotype > or =  = she would be a candidate for adjuvant chemotherapy; another discussion would need to happen in the clinic to further discuss chemotherapy and assist in helping her make a decision.    Plan/Recommendations: Patient is scheduled to see Dr. Rodrigues for radiation oncology consultation on 7/17.  I will be on the look up for her Oncotype results.  As soon as we see them, we will discuss with Dr Becerra and update patient.  She knows she will be getting a call from us to further discuss these results.    Signature:  Zulay Galvan RN

## 2025-07-16 NOTE — PROGRESS NOTES
"Oncology Rooming Note    July 16, 2025 1:06 PM   Dany Diggs is a 65 year old female who presents for:    Chief Complaint   Patient presents with    Oncology Clinic Visit     New patient consult     Initial Vitals: BP (!) 182/89 (BP Location: Left arm, Patient Position: Sitting, Cuff Size: Adult Regular)   Pulse 76   Temp 97.2  F (36.2  C) (Tympanic)   Resp 18   Ht 1.626 m (5' 4\")   Wt 67.1 kg (148 lb)   SpO2 99%   BMI 25.40 kg/m   Estimated body mass index is 25.4 kg/m  as calculated from the following:    Height as of this encounter: 1.626 m (5' 4\").    Weight as of this encounter: 67.1 kg (148 lb). Body surface area is 1.74 meters squared.  Mild Pain (2) Comment: Data Unavailable   No LMP recorded. Patient is postmenopausal.  Allergies reviewed: Yes  Medications reviewed: Yes    Medications: Medication refills not needed today.  Pharmacy name entered into [employer]: [facility] #[id number] - [place], [place] - [address] AT [facility] [address]    PHQ9:  Did this patient require a PHQ9?: No      Clinical concerns:       ABBY MCKOY CMA            "

## 2025-07-16 NOTE — PROGRESS NOTES
Faxed and sent to scan    [facility] Hematology and Oncology Consult Note    Patient: Dany Diggs  MRN: [id number]  Date of Service: Jul 16, 2025           Reason for consultation      Problem List Items Addressed This Visit          Oncology    Malignant neoplasm of lower-inner quadrant of right breast of female, estrogen receptor positive (H) - Primary    Relevant Orders    Comprehensive metabolic panel         Assessment / number of problems addressed      Very pleasant 65 years old postmenopausal woman with right-sided invasive ductal carcinoma with multiple masses.  The largest 17 mm in size.  The other 2 ones were 11 mm and 7 mm respectively.  ER positive [place] positive HER2/heydi 2+ on immunohistochemistry but FISH negative.  She is status postlumpectomy.  Margins negative for invasive ductal carcinoma.  Extensive DCIS noted in the surgical specimen.  Margins were very close to positive.  No family history of breast cancer.  Good general health.    Plan and medical decision making      Reviewed notes from Dr. Singh Tinoco personally reviewed the images of the mammogram showing the clip placed.  Reviewed labs including CBC CMP and pathology report..  Ordered Oncotype test.  Discussed with the patient the management of Oncotype test and its implications.  Independent historian account obtained from the .    Await Oncotype result.  If the Oncotype is less than 25 she will not need adjuvant chemotherapy.  She can then be treated with radiation therapy followed by adjuvant endocrine therapy afterwards.  We would start her on anastrozole.  If her Oncotype comes back more than or equal to 25 then she will be a candidate for adjuvant chemotherapy.  We will obviously talk to her about it and then depending upon her decision proceed.  She will need bone density measurements at some point.  Continue good diet and exercise.  Plan to follow-up in 2 months timeframe with the anticipation that she will have a low and Oncotype score.   We will call in a prescription of an aromatase inhibitor and then she will see me back after having taken it for about a month or so.  The longitudinal plan of care for the diagnosis(es)/condition(s) as documented were addressed during this visit. Due to the added complexity in care, I will continue to support Dany in the subsequent management and with ongoing continuity of care.     Clinical/pathological stage      Cancer Staging   Malignant neoplasm of lower-inner quadrant of right breast of female, estrogen receptor positive (H)  Staging form: Breast, AJCC 8th Edition  - Pathologic stage from 6/18/2025: Stage IA (pT1c, pN0, cM0, G2, ER+, LA+, HER2-) - Unsigned      History of present illness      Ms. Dany Diggs is a 65 year old postmenopausal woman with a new diagnosis of invasive ductal carcinoma right breast.  She actually felt it herself and April 2025.  Went in for a mammogram which was suspicious.  She had multiple masses noted in her right breast.  The original mass that she felt was located in the lower inner quadrant.  She then underwent biopsy which was invasive ductal carcinoma ER positive LA positive HER2/heydi negative by FISH with 2+ on immunohistochemistry.  She was then seen by Dr. Singh Tinoco.  Underwent mastectomy because of the presence of multiple masses.  The surgery was performed on 18 June 2025.  Pathology showed 3 masses with the largest one 17 mm in size the smaller 7 mm in size.  There was extensive amount of DCIS present.  The margins for DCIS were positive.    4 sentinel lymph nodes were removed and they were negative.    She did have reconstructive surgery done.  Comes in today for follow-up to discuss her adjuvant therapy options.  She is accompanied by her significant other.    She did have genetic testing done and that was negative.  She does not have any family history.  She did have 1 tubal pregnancy for which she underwent oophorectomy and salpingectomy.  Did have  significant oral contraceptive use for over 15 years.  She never became pregnant after that.  No prior breast biopsies.    Detailed review of systems      A review of systems was obtained.  Positive findings noted in the history.  Rest of the review of system is otherwise negative.      Past medical/surgical/social/family history        Past Medical History:   Diagnosis Date    Back pain     Breast cancer (H)     right    Gastroesophageal reflux disease     HLD (hyperlipidemia)     Hypertension     Rosacea     Seborrheic keratosis      Past Surgical History:   Procedure Laterality Date    BIOPSY NODE SENTINEL Right 2025    Procedure: WITH SENTINEL LYMPH NODE BIOPSY;  Surgeon: Zulay Larson DO;  Location: [facility] OR    DENTAL SURGERY      Fallopian tube and ovary removal  1980    MASTECTOMY SIMPLE Right 2025    Procedure: SKIN-SPARING MASTECTOMY;  Surgeon: Zulay Larson DO;  Location: [facility] OR    MASTOPEXY Left 2025    Procedure: LEFT MASTOPEXY FOR SYMMETRY;  Surgeon: Karen Nazario MD;  Location: [facility] OR    NASAL FRACTURE SURGERY      RECONSTRUCT BREAST, IMPLANT PROSTHESIS, COMBINED Right 2025    Procedure: RECONSTRUCTION BREAST RIGHT WITH IMPLANT INSERTION,;  Surgeon: Karen Nazario MD;  Location: [facility] OR     No family history on file.  Social History     Socioeconomic History    Marital status:      Spouse name: None    Number of children: None    Years of education: None    Highest education level: None   Tobacco Use    Smoking status: Former     Current packs/day: 0.00     Types: Cigarettes     Quit date:      Years since quittin.5     Passive exposure: Never    Smokeless tobacco: Never   Vaping Use    Vaping status: Never Used   Substance and Sexual Activity    Alcohol use: Yes     Alcohol/week: 7.0 - 14.0 standard drinks of alcohol     Types: 7 - 14 Standard drinks or equivalent per week     Comment: couple times a week - wine  "   Drug use: Never     Social Drivers of Health      Received from [facility]    Financial Resource Strain    Received from [facility]    Social Connections   Interpersonal Safety: Low Risk  (6/18/2025)    Interpersonal Safety     Do you feel physically and emotionally safe where you currently live?: Yes     Within the past 12 months, have you been hit, slapped, kicked or otherwise physically hurt by someone?: No     Within the past 12 months, have you been humiliated or emotionally abused in other ways by your partner or ex-partner?: No           Allergies      Allergies   Allergen Reactions    Codeine Unknown    Morphine     Sulfamethoxazole-Trimethoprim Rash         Physical exam        BP (!) 182/89 (BP Location: Left arm, Patient Position: Sitting, Cuff Size: Adult Regular)   Pulse 76   Temp 97.2  F (36.2  C) (Tympanic)   Resp 18   Ht 1.626 m (5' 4\")   Wt 67.1 kg (148 lb)   SpO2 99%   BMI 25.40 kg/m      GENERAL: No acute distress. Cooperative in conversation.   HEENT:  Pupils are equal, round and reactive. Oral mucosa is clean and intact. No ulcerations or mucositis noted. No bleeding noted.  RESP:Chest symmetric lungs are clear bilaterally per auscultation. Regular respiratory rate. No wheezes or rhonchi.  CV: Normal S1 S2 Regular, rate and rhythm.     ABD: Nondistended, soft, nontender. Positive bowel sounds. No organomegaly.   EXTREMITIES: No lower extremity edema.   NEURO: Non- focal. Alert and oriented x3.  Cranial nerves appear intact.  PSYCH: Within normal limits. No depression or anxiety.  SKIN: Warm dry intact.       Laboratory data      No results found for this or any previous visit (from the past week).    Imaging results        NM Lymphoscintigraphy Injection only  Result Date: 6/18/2025  INDICATION: Breast Carcinoma. Pre-operative identification of the right sentinel lymph node. PROCEDURE: Informed consent was obtained " from the patient. The skin was prepped with ChloraPrep.  560 uCi Tc-99m Lymphoseek was injected subdermally along the upper outer aspect of the areolar margin. The patient tolerated this well.  The procedure was performed by CHAPO DING on 6/18/2025, at 11:48 AM.    IMPRESSION: [place] lymph node injection.           This note has been dictated using voice recognition software. Any grammatical or context distortions are unintentional and inherent to the software      Signed by: Deangelo Becerra MD

## 2025-07-17 ENCOUNTER — PATIENT OUTREACH (OUTPATIENT)
Dept: ONCOLOGY | Facility: HOSPITAL | Age: 65
End: 2025-07-17
Payer: COMMERCIAL

## 2025-07-17 ENCOUNTER — OFFICE VISIT (OUTPATIENT)
Dept: RADIATION ONCOLOGY | Facility: HOSPITAL | Age: 65
End: 2025-07-17
Attending: SURGERY
Payer: COMMERCIAL

## 2025-07-17 VITALS
OXYGEN SATURATION: 100 % | DIASTOLIC BLOOD PRESSURE: 97 MMHG | HEART RATE: 77 BPM | SYSTOLIC BLOOD PRESSURE: 185 MMHG | RESPIRATION RATE: 16 BRPM

## 2025-07-17 DIAGNOSIS — C50.311 MALIGNANT NEOPLASM OF LOWER-INNER QUADRANT OF RIGHT BREAST OF FEMALE, ESTROGEN RECEPTOR POSITIVE (H): Primary | ICD-10-CM

## 2025-07-17 DIAGNOSIS — Z17.0 MALIGNANT NEOPLASM OF LOWER-INNER QUADRANT OF RIGHT BREAST OF FEMALE, ESTROGEN RECEPTOR POSITIVE (H): Primary | ICD-10-CM

## 2025-07-17 DIAGNOSIS — C50.911 INVASIVE DUCTAL CARCINOMA OF RIGHT BREAST (H): ICD-10-CM

## 2025-07-17 PROCEDURE — G0463 HOSPITAL OUTPT CLINIC VISIT: HCPCS | Performed by: STUDENT IN AN ORGANIZED HEALTH CARE EDUCATION/TRAINING PROGRAM

## 2025-07-17 RX ORDER — ANASTROZOLE 1 MG/1
1 TABLET ORAL DAILY
Qty: 90 TABLET | Refills: 3 | Status: SHIPPED | OUTPATIENT
Start: 2025-07-17

## 2025-07-17 ASSESSMENT — PAIN SCALES - GENERAL: PAINLEVEL_OUTOF10: MILD PAIN (3)

## 2025-07-17 NOTE — PROGRESS NOTES
[facility]: Cancer Care                                                                                            Situation: Patient chart reviewed by care coordinator.    Background: Patient with a newly diagnosed right breast invasive ductal carcinoma. She had multiple masses with the largest being 17 mm in size. This is ER/MS positive and HER2/heydi negative by FISH. Right breast mastectomy on 6/18/2025. 4 lymph nodes were removed and none of them showed cancer. Oncotype was sent for on 6/25/2025.     Assessment: Patient was seen in the clinic yesterday by Dr Becerra in consultation.  Oncotype score was not back yet.  This is now resulted in his come back with a recurrence score of 3.  Per Dr Becerra, no adjuvant chemotherapy is needed.  She should be treated with radiation followed by adjuvant endocrine therapy with anastrozole.  This has been sent into her pharmacy.    Plan/Recommendations: Call placed to patient to give her the above information.  She did already hear from her surgeons office with the results.  We discussed anastrozole, its side effects and when she should start.  I let her know that she should start this after radiation is complete.  Her current appointment scheduled for labs and to see Dr Becerra on September 17 may be okay.  We want this to be about 4 weeks after starting anastrozole so that we can check in and see how she is doing.    She verbalized understanding and was appreciative.    Signature:  Zulay Galvan RN

## 2025-07-17 NOTE — LETTER
7/17/2025      Dany Diggs  [address] [facility] [address] [place] [id number]      Dear Colleague,    Thank you for referring your patient, Dany Diggs, to the [facility] RADIATION ONCOLOGY [place]. Please see a copy of my visit note below.    [facility] Radiation Oncology Consult Note     Patient: Dany Diggs  MRN: [id number]  Date of Service: 07/17/2025          Zulay Larson,   [address] [id number]       Dear Dr. Larson:    Thank you very much for referring this patient for consideration of radiotherapy. As you know Ms. Diggs is a 65 year old female with a diagnosis of ER/MD+ HER2- R breast cancer. She was seen today for consideration of post-mastectomy RT.     HISTORY OF PRESENT ILLNESS:   Ms. Diggs is a 65 year old female with pT1c(m) N0(sn) M0 ER/MD+ HER2- IDC of the R breast s/p mastectomy and SLNB with negative invasive margins but positive DCIS margins.      She initially presented with R breast lump      4/24/25 Bilateral diagnostic mammogram:  Breast parenchyma is heterogeneous.  Skin marker has been placed over region of clinical concern in the anterior right breast.  There is a 12 mm irregular mass in the subareolar breast.  Further evaluation by ultrasound is recommended and will be performed.  There is no abnormality in the remaining right breast.  The left breast is unremarkable.      4/24/25 R breast US:  Attention was directed to the lower outer right breast.  There is a 12 x 7 x 8 mm irregular lobulated mass with internal vascularity, correlating with mammographic findings.  There are 2 smaller lesions in the adjacent parenchyma measuring 4 and 5 mm respectively.  The axilla is negative.  (BI-RADS 4)      5/2/25 US-guided R breast biopsy:  A) RIGHT BREAST, 7:00, 1 CM FROM NIPPLE, ULTRASOUND-GUIDED CORE BIOPSY:   1. Invasive ductal carcinoma      a. [place] grade: II of III; Linda score: 6 of 9      b.  Angio-lymphatic invasion: Absent      c. Associated DCIS: Present      d. Subtype: Solid and papillary with focal necrosis      e. Grade of DCIS: 1-2 of 3   2. Breast Ancillary Testing:        a. Hormone Receptors:             Estrogen receptor: Positive (96%, strong staining)             Progesterone receptor: Positive (76%, moderate staining)       b. HER2 by IHC: Equivocal (2+ by manual morphometry)          HER2 by FISH: Negative             HER2/CEP17 ratio: 1.06             HER2 signals/cell: 1.94             CEP17 signals/cell: 1.82       c. Ki-67: 22%     B) RIGHT BREAST, 7:00, 3 CM FROM NIPPLE, ULTRASOUND-GUIDED CORE BIOPSY:   1. Invasive ductal carcinoma measuring 3 mm      a. [place] grade: II of III; Linda score: 6 of 9      b. Angio-lymphatic invasion: Absent      c. Associated DCIS: Indeterminate   2. Estrogen and progesterone receptor immunohistochemistry, and HER2      analysis are deferred to part A    Test(s) Performed:           Estrogen Receptor (ER) Status:    Positive (greater than 10% of cells demonstrate nuclear positivity)          Percentage of Cells with Nuclear Positivity:    96 %          Average Intensity of Staining:    Strong        Test Type:    Laboratory-developed test        Primary Antibody:    SP1      Test(s) Performed:           Progesterone Receptor (PgR) Status:    Positive          Percentage of Cells with Nuclear Positivity:    76 %          Average Intensity of Staining:    Moderate        Test Type:    Laboratory-developed test        Primary Antibody:    16      Test(s) Performed:           HER2 by Immunohistochemistry:    Equivocal (Score 2+)          Percentage of Cells with Uniform Intense Complete Membrane Stainin %        Test Type:    Laboratory-developed test        Primary Antibody:    4B5      Test(s) Performed:           HER2 by in situ Hybridization:    Negative (not amplified)        Number of Observers:    2        Number of Invasive Tumor  Cells Counted:    25 cells        Method:    Dual probe assay          Average Number of HER2 Signals per Cell:    1.94          Average Number of CEP17 Signals per Cell:    1.82          HER2 / CEP17 Ratio:    1.06        Aneusomy:    Not identified        Heterogeneous Signals:    Not identified        Test Type:    Food and Drug Administration (FDA) cleared (test / vendor): [employer] HER2/Candelario      Test(s) Performed:    Ki-67        Ki-67 Percentage of Positive Nuclei:    22 %        Primary Antibody:    MIB1      Cold Ischemia and Fixation Times:    Meet requirements specified in latest version of the ASCO / CAP Guidelines      Testing Performed on Block Number(s):    A1       5/22/25 Genetic testing: negative      6/18/25 R mastectomy and SLNB with immediate implant reconstruction and dermal flap and L mastopexy:  A.  Breast, left skin lesion, excisional biopsy:  - Seborrheic keratosis.     B.  Breast, right, mastectomy:   -INVASIVE CARCINOMA OF NO SPECIAL TYPE (DUCTAL).  -[place] Grade:  2  (Tubule score = 3, Nuclear score = 2, Mitotic score = 1, total score= 6/9).  -Ductal Carcinoma in situ (DCIS): Extensive DCIS, grade 2, solid and papillary.  -Multiple masses: #1,0.4 x 0.2 x 0.2 cm.  #2, 1.7 x 1.1 x 0.7 cm.  #3, 1.3 x 1.0 x 0.9 cm.  -Margins of invasive carcinoma: Negative. Nearest, posterior and anterior-superior, 2 mm.  -Margins of DCIS: POSITIVE.  DCIS present at the junction of the anterior-superior and anterior-inferior margins (block B12).  Second nearest, posterior margin, 0.5 mm (block B14).  -Breast biomarkers ([facility], biopsy# [id number]):        -ESTROGEN RECEPTOR (ER): Positive.       -PROGESTERONE RECEPTOR (GA): Positive.       -HER2(IHC): Equivocal (2+).       -HER2 FISH: Negative.       -Ki-67: 22%  -Additional findings: Two biopsy sites/cavities identified (corresponding with masses #2 and #3).  Focus of atypical ductal hyperplasia in nipple lactiferous duct.  -Best tumor  block for ancillary studies: B3.  -See tumor synoptic report.     C.  Lymph node, right sentinel, excisional biopsy:   - Multiple (4) benign lymph nodes, negative for metastatic carcinoma (0/4).  - Aggregate of benign fibroadipose and peripheral nerve tissue.      Tumor Site  Clock position     7 o'clock   Histologic Type  Invasive carcinoma of no special type (ductal)   Histologic Grade ([place] Histologic Score)     Glandular (Acinar) / Tubular Differentiation  Score 3   Nuclear Pleomorphism  Score 2   Mitotic Rate  Score 1   Overall Grade  Grade 2 (scores of 6 or 7)   Tumor Size  Greatest dimension of largest invasive focus (Millimeters): 17 mm   Additional Dimension (Millimeters)  11 mm     7 mm   Tumor Focality  Multiple foci of invasive carcinoma   Sizes of Individual Foci in Millimeters (mm)  17 mm; 14 mm; 4 mm;   Ductal Carcinoma In Situ (DCIS)  Present     Positive for extensive intraductal component (EIC)   Architectural Patterns  Papillary     Solid   Nuclear Grade  Grade II (intermediate)   Necrosis  Not identified   Number of Blocks with DCIS  13   Number of Blocks Examined  22   Lobular Carcinoma In Situ (LCIS)  Not identified   Lymphatic and / or Vascular Invasion  Not identified   Dermal Lymphatic and / or Vascular Invasion  Not identified   Microcalcifications  Present in non-neoplastic tissue   Treatment Effect in the Breast  No known presurgical therapy   MARGINS   Margin Status for Invasive Carcinoma  All margins negative for invasive carcinoma   Distance from Invasive Carcinoma to Closest Margin  2 mm   Closest Margin(s) to Invasive Carcinoma  Posterior     Anterior-superior   Distance from Invasive Carcinoma to Other Margin(s)  All remaining margins are free by greater than 5 mm   Margin Status for DCIS  DCIS present at margin   Margin(s) Involved by DCIS  Anterior: At the junction of the anterior-superior and anterior-inferior margins, over 2 mm extent   Distance from DCIS to Posterior  Margin  0.5 mm   Distance from DCIS to Other Margin(s)  All remaining margins are free by greater than 5 mm   REGIONAL LYMPH NODES   Regional Lymph Node Status  All regional lymph nodes negative for tumor   Total Number of Lymph Nodes Examined (sentinel and non-sentinel)  4   Number of [place] Nodes Examined  4   pTNM CLASSIFICATION (AJCC 8th Edition)   Reporting of pT, pN, and (when applicable) pM categories is based on information available to the pathologist at the time the report is issued. As per the AJCC (Chapter 1, 8th Ed.) it is the managing physician's responsibility to establish the final pathologic stage based upon all pertinent information, including but potentially not limited to this pathology report.   pT Category  pT1c   T Suffix  (m)   pN Category  pN0   N Suffix  (sn)   ADDITIONAL FINDINGS   Additional Findings  Two biopsy sites/cavities identified (corresponding with masses #2 and #3).  Focus of atypical ductal hyperplasia in nipple lactiferous duct.   SPECIAL STUDIES        Estrogen Receptor (ER) Status  Positive (greater than 10% of cells demonstrate nuclear positivity)   Percentage of Cells with Nuclear Positivity  %        Progesterone Receptor (PgR) Status  Positive   Percentage of Cells with Nuclear Positivity  71-80%        HER2 (by immunohistochemistry)  Equivocal (Score 2+)   Percentage of Cells with Uniform Intense Complete Membrane Staining  Cannot be determined        HER2 (by in situ hybridization)  Negative (not amplified)        Ki-67 Percentage of Positive Nuclei  22 %   Testing Performed on Case Number  [id number] [facility] [place]       6/18/25 Oncotype: 3      On exam today, Dany reports that she is overall doing well since the time of her recent surgery.  She reports that her surgical incisions remain little tender but she is otherwise not having significant pain.  She states that she is a little sore under her right arm in the region of her sentinel lymph node biopsy and  states that the incision along the inferior aspect of the left breast is also a little more tender than other areas.  She has noticed a little surface irritation in this area which she attributes to wearing a compression bra.  She has not noticed other areas of erythema around her incisions.  She has not noticed any drainage or swelling associated with the incisions.  She has been taking Advil mostly for back pain which has also helped somewhat with her breast tenderness.  No recent fevers, chills, nausea, vomiting.  The remainder of her review of systems is otherwise unremarkable.  She has good range of motion in her arms.  She just talked with medical oncology earlier today and found out her Oncotype was 3 and she was happy to hear she would not require chemotherapy.  She has no history of prior malignancy or prior radiotherapy.  No implantable devices.  No autoimmune diseases or connective tissue disorders.  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       CHEMOTHERAPY HISTORY: Concurrent Chemotherapy: No    RADIATION THERAPY HISTORY: Prior Radiation: No    IMPLANTED CARDIAC DEVICE: none     PREGNANCY: The patient is informed not to be pregnant during the radiation therapy.  She is post menopausal or had tubal ligation or her  had vasectomy.    Current Outpatient Medications   Medication Sig Dispense Refill     anastrozole (ARIMIDEX) 1 MG tablet Take 1 tablet (1 mg) by mouth daily. 90 tablet 3     atorvastatin (LIPITOR) 10 MG tablet Take 10 mg by mouth daily.       betamethasone dipropionate (DIPROSONE) 0.05 % external ointment Apply topically 2 times daily. (Patient not taking: Reported on 7/16/2025)       cyclobenzaprine (FLEXERIL) 5 MG tablet  (Patient not taking: Reported on 7/16/2025)       esomeprazole (NEXIUM) 20 MG DR capsule Take 20 mg by mouth every other day.       hydrochlorothiazide (MICROZIDE) 12.5 MG capsule Take 12.5 mg by mouth every morning.       ibuprofen  (ADVIL/MOTRIN) 200 MG tablet Take 3 Tablets (600 mg) by mouth as needed for Pain.       lisinopril (ZESTRIL) 10 MG tablet Take 10 mg by mouth daily.       LORazepam (ATIVAN) 0.5 MG tablet Take 0.5 mg by mouth every 6 hours as needed for anxiety. Flying       metroNIDAZOLE (METROCREAM) 0.75 % external cream Apply topically 2 times daily.       Multiple Vitamin (MULTIVITAMIN PO) Take 1 tablet by mouth daily.       valACYclovir (VALTREX) 500 MG tablet  (Patient not taking: Reported on 7/16/2025)       Past Medical History:   Diagnosis Date     Back pain      Breast cancer (H)     right     Gastroesophageal reflux disease      HLD (hyperlipidemia)      Hypertension      Rosacea      Seborrheic keratosis      Past Surgical History:   Procedure Laterality Date     BIOPSY NODE SENTINEL Right 6/18/2025    Procedure: WITH SENTINEL LYMPH NODE BIOPSY;  Surgeon: Zulay Larson DO;  Location: [facility] OR     DENTAL SURGERY       Fallopian tube and ovary removal  1980     MASTECTOMY SIMPLE Right 6/18/2025    Procedure: SKIN-SPARING MASTECTOMY;  Surgeon: Zulay Larson DO;  Location: [facility] OR     MASTOPEXY Left 6/18/2025    Procedure: LEFT MASTOPEXY FOR SYMMETRY;  Surgeon: Karen Nazario MD;  Location: [facility] OR     NASAL FRACTURE SURGERY       RECONSTRUCT BREAST, IMPLANT PROSTHESIS, COMBINED Right 6/18/2025    Procedure: RECONSTRUCTION BREAST RIGHT WITH IMPLANT INSERTION,;  Surgeon: Karen Nazario MD;  Location: [facility] OR     Allergies   Allergen Reactions     Codeine Unknown     Morphine      Sulfamethoxazole-Trimethoprim Rash     No family history on file.  Social History     Socioeconomic History     Marital status:      Spouse name: Not on file     Number of children: Not on file     Years of education: Not on file     Highest education level: Not on file   Occupational History     Not on file   Tobacco Use     Smoking status: Former     Current packs/day: 0.00     Types:  Cigarettes     Quit date: 2017     Years since quittin.5     Passive exposure: Never     Smokeless tobacco: Never   Vaping Use     Vaping status: Never Used   Substance and Sexual Activity     Alcohol use: Yes     Alcohol/week: 7.0 - 14.0 standard drinks of alcohol     Types: 7 - 14 Standard drinks or equivalent per week     Comment: couple times a week - wine     Drug use: Never     Sexual activity: Not on file   Other Topics Concern     Not on file   Social History Narrative     Not on file     Social Drivers of Health     Financial Resource Strain: High Risk (2022)    Received from [employer]    Financial Resource Strain      Difficulty of Paying Living Expenses: Not on file      Difficulty of Paying Living Expenses: Not on file   Food Insecurity: Not on file   Transportation Needs: Not on file   Physical Activity: Not on file   Stress: Not on file   Social Connections: Unknown (2022)    Received from [employer]    Social Connections      Frequency of Communication with Friends and Family: Not on file   Interpersonal Safety: Low Risk  (2025)    Interpersonal Safety      Do you feel physically and emotionally safe where you currently live?: Yes      Within the past 12 months, have you been hit, slapped, kicked or otherwise physically hurt by someone?: No      Within the past 12 months, have you been humiliated or emotionally abused in other ways by your partner or ex-partner?: No   Housing Stability: Not on file        REVIEW OF SYMPTOMS:  A full 14-point review of systems was performed. Pertinent findings are noted in the HPI.    General  Constitutional  Constitutional (WDL): All constitutional elements are within defined limits  EENT  Eye Disorders  Eye Disorder (WDL): All eye disorder elements are within defined limits  Ear Disorders  Ear Disorder (WDL): All ear disorder elements are within defined  limits  Respiratory  Respiratory  Respiratory (WDL): All respiratory elements are within defined limits  Cardiovascular  Cardiovascular  Cardiovascular (WDL): All cardiovascular elements are within defined limits  Gastrointestinal  Gastrointestinal  Gastrointestinal (WDL): All gastrointestinal elements are within defined limits  Musculoskeletal  Musculoskeletal and Connective Tissue Disorders  Musculoskeletal & Connective (WDL): Exceptions to WDL (some low back pain, working up on ROM since breast surg.)  Arthralgia: Mild pain  Generalized Muscle Weakness: Symptomatic OR perceived by patient but not evident on physical exam  Integumentary  Integumentary  Integumentary (WDL): All integumentary elements are within defined limits  Neurological  Neurosensory  Neurosensory (WDL): All neurosensory elements are within defined limits  Genitourinary/Reproductive  Genitourinary  Genitourinary (WDL): All genitourinary elements are within defined limits  Lymphatic  Lymph System Disorders  Lymph (WDL): All lymph elements are within defined limits  Pain  Pain Score: Mild Pain (3)  AUA Assessment                                                              Accompanied by  Accompanied By: self only    ECOG Status: 0 - Independent    KPS Score: 90% Can perform normal activity, minor signs of disease    Pain Management Plan: Advil/tylenol prn    Recent Labs: No results found for this or any previous visit (from the past week).    Imaging: Imaging results 6 weeks:NM Lymphoscintigraphy Injection only  Result Date: 6/18/2025  INDICATION: Breast Carcinoma. Pre-operative identification of the right sentinel lymph node. PROCEDURE: Informed consent was obtained from the patient. The skin was prepped with ChloraPrep.  560 uCi Tc-99m Lymphoseek was injected subdermally along the upper outer aspect of the areolar margin. The patient tolerated this well.  The procedure was performed by CHAPO DING on 6/18/2025, at 11:48 AM.    IMPRESSION:  [place] lymph node injection.       Pathology:   No results found for this or any previous visit (from the past 8760 hours).              Objective:        PHYSICAL EXAMINATION:    BP (!) 185/97   Pulse 77   Resp 16   SpO2 100%     Gen: Alert, in NAD  Eyes: PERRL, EOMI, sclera anicteric  HENT     Head: NC/AT     Ears: No external auricular lesions     Nose/sinus: No rhinorrhea or epistaxis     Oropharynx: MMM, no visible oral lesions  Neck: Supple, full ROM, no LAD  Pulm: No wheezing, stridor or respiratory distress  CV: Well-perfused, no cyanosis, no pedal edema  Abdominal: Nondistended  Breasts: R skin sparing mastectomy with implant reconstruction and L mastopexy incisions are CDI. There is some mild erythema associated with inferior L breast incision centrally consistent with mild dermatitis. Incisions are CDI. She is mildly tender to palpation of L breast and R reconstruction. No adenopathy slava.  Rectal: Deferred  : Deferred  Musculoskeletal: Normal muscle bulk and tone  Skin: Normal color and turgor  Neurologic: CN II-XII grossly intact, normal gait and station  Psychiatric: Appropriate mood and affect      Intent of Therapy: Curative        Impression     Visit Dx:  (C50.311,  Z17.0) Malignant neoplasm of lower-inner quadrant of right breast of female, estrogen receptor positive (H)  (primary encounter diagnosis)    (C50.911) Invasive ductal carcinoma of right breast (H)       Cancer Staging   Malignant neoplasm of lower-inner quadrant of right breast of female, estrogen receptor positive (H)  Staging form: Breast, AJCC 8th Edition  - Pathologic stage from 6/18/2025: Stage IA (pT1c, pN0, cM0, G2, ER+, AL+, HER2-) - Unsigned      Assessment & Plan:   65 year old female with pT1c(m) N0(sn) M0 ER/AL+ HER2- IDC of the R breast s/p mastectomy and SLNB with negative invasive margins but positive DCIS margins.    I had a detailed discussion with Dany regarding the role of radiotherapy in the overall management  of her early-stage ER/IL positive HER2 negative invasive ductal carcinoma of the right breast.  I discussed that she does not have pathologic features that suggest that she would benefit from adjuvant radiotherapy to the right chest wall aside from a positive DCIS margin.  I discussed that the optimal management of a positive DCIS margin following mastectomy in the context of associated invasive breast cancer is controversial.  Ideally, she could undergo reexcision to negative margins.  However, given her immediate reconstruction, it is not likely that the region of the positive margin can be readily identified.  There is very limited data to guide decision making related to adjuvant RT in this situation.  There is some single institution retrospective data from multiple sources examining the risk of recurrence associated with a positive margin following mastectomy for pure DCIS.  In this situation, the risk of recurrence overall appears to be very low and there appears to be minimal benefit for the addition of radiotherapy.  It is unclear how the presence of invasive disease associated with DCIS would influence these outcomes but I assume her risk of recurrence would be similar to that of the patients from the studies.  Given that the extent of the involved margin was fairly small at 2 mm and her DCIS was of intermediate nuclear grade and not associated with any necrosis, I suspect her risk of recurrence without radiotherapy would be fairly low overall.  The benefit of radiotherapy in this context is also somewhat unclear although it is likely to provide some benefit in terms of local control.  I discussed that this local control benefit is weighed against her risk of toxicity, specifically her risk of implant complications given immediate reconstruction.  Overall, I think the best course of action is likely omission of radiotherapy with close clinical surveillance.  On the other hand, it would certainly offer  radiotherapy with the goal of improved local control if this is her preference.  I discussed that there is no evidence that radiotherapy would be expected to improve survival outcomes.  I reviewed the logistics as well as expected acute and potential late toxicities of chest wall radiotherapy in detail with the patient.  I discussed that treatment would likely consist of 3 weeks of daily radiotherapy treatments directed at the right chest wall.  She does not have indications for treatment of her lymph nodes and I would therefore not plan to include lymph node regions.  Toxicities include but are not limited to fatigue, skin reaction, soft tissue fibrosis, cosmetic changes, implant complications including capsular contraction or implant loss, hyperpigmentation, lymphedema, pneumonitis, increased risk of cardiac events, secondary malignancy, etc.  She had a number of questions which were answered to the best of my ability.  Upon conclusion of our discussion, she remains undecided about whether she would like to proceed with adjuvant radiotherapy or not.  I offered to discuss her case with the rest of her care team to determine whether there are any other features of her case that are not evident from her available history.  She will also plan to think about her preferences as well.  Up to this point, she had been more or less assuming that she would require radiotherapy and was happy to hear that she may not require this but remains somewhat hesitant to proceed without adjuvant radiotherapy at this time.  I will plan to discuss her case with her surgical and medical on oncology team and relay the results of that discussion to her.  In the event that she opts for adjuvant radiotherapy, my tentative plan would be to deliver 4005 cGy in 15 daily fractions to the right chest wall.        Total time of this visit, including time spent face-to-face with patient and or via video/audio, and also in preparing for today's visit  "for MDM and documentation. Medical decision-making included consideration and possible diagnoses, management options, complex record review, review of diagnostic tests and information, consideration and discussion of significant complications based on comorbidities, discussion with providers involved in the care of the patient.     85 Minutes spent.     This note has been dictated using voice recognition software and as a result may contain minor grammatical errors and unintended word substitutions.         Sincerely,      Adair Rodrigues MD  Department of Radiation Oncology   [facility] Radiation Oncology  Tel: [phone]  Page: [phone]    [facility]  [address] [place] [address]     [facility]   [address] [place] [address]    CC:  Patient Care Team:  Nataly Ortiz PA-C as PCP - General (Physician Assistant)  Zulay Larson DO as Physician (Surgery)  Zulay Larson DO as Assigned Surgical Provider  Deangelo Becerra MD as MD (Hematology)  Adair Rodrigues MD as MD (Radiation Oncology)  Zulay Galvan RN as Specialty Care Coordinator (Hematology & Oncology)        Considerations for radiation treatment   Pregnancy status: Female age 55+   Implanted Cardiac Devices: No   Any previous radiation therapy: No  Auto Immune Diseases: No    Oncology Rooming Note    July 17, 2025 11:16 AM   Dany Diggs is a 65 year old female who presents for:    Chief Complaint   Patient presents with     Oncology Clinic Visit     Breast Cancer     Rad Onc consult     Initial Vitals: BP (!) 185/97   Pulse 77   Resp 16   SpO2 100%  Estimated body mass index is 25.4 kg/m  as calculated from the following:    Height as of 7/16/25: 1.626 m (5' 4\").    Weight as of 7/16/25: 67.1 kg (148 lb). There is no height or weight on file to calculate BSA.  Mild Pain (3) Comment: Data Unavailable   No LMP recorded. Patient is postmenopausal.  Allergies reviewed: " No  Medications reviewed: No, done yesterday    Medications: Medication refills not needed today.  Pharmacy name entered into [facility]: [id number] - [place], [place] - [address] AT [facility] [address]    PHQ9:  Did this patient require a PHQ9?: No      Clinical concerns: Feeling fairly well post-op, good ROM. Some low back pain which she had prior to surgery, I told her it's okay to proceed with PT that the orthopaedic team prescribed.  Dr. Rodrigues was notified.      Denisse Graham, RN            Radiation Therapy Patient Education    Person involved with teaching: Patient    Patient educational needs for self management of treatment-related side effects assessment completed.  [facility] Patient Ed tab contains Patient Learning Assessment    Education Materials Given  Radiation Treatment For Cancer, Radiation Therapy to the Breast Guidelines, Oncology Supportive Care Services , Welcome Letter, and Insurance PA Information    Educational Topics Discussed  Side effects expected    Response To Teaching  Verbalizes understanding    GYN Only  Vaginal Dilator-given and educated: N/A    Referrals sent: None    Chemotherapy?  No        Again, thank you for allowing me to participate in the care of your patient.        Sincerely,        Adair Rodrigues MD    Electronically signed

## 2025-07-17 NOTE — PROGRESS NOTES
"Considerations for radiation treatment   Pregnancy status: Female age 55+   Implanted Cardiac Devices: No   Any previous radiation therapy: No  Auto Immune Diseases: No    Oncology Rooming Note    July 17, 2025 11:16 AM   Dany Diggs is a 65 year old female who presents for:    Chief Complaint   Patient presents with    Oncology Clinic Visit    Breast Cancer     Rad Onc consult     Initial Vitals: BP (!) 185/97   Pulse 77   Resp 16   SpO2 100%  Estimated body mass index is 25.4 kg/m  as calculated from the following:    Height as of 7/16/25: 1.626 m (5' 4\").    Weight as of 7/16/25: 67.1 kg (148 lb). There is no height or weight on file to calculate BSA.  Mild Pain (3) Comment: Data Unavailable   No LMP recorded. Patient is postmenopausal.  Allergies reviewed: No  Medications reviewed: No, done yesterday    Medications: Medication refills not needed today.  Pharmacy name entered into [facility]: [id number] - [place], [place] - [address] [facility] [place] [facility] [address]    PHQ9:  Did this patient require a PHQ9?: No      Clinical concerns: Feeling fairly well post-op, good ROM. Some low back pain which she had prior to surgery, I told her it's okay to proceed with PT that the orthopaedic team prescribed.  Dr. Rodrigues was notified.      Denisse Graham, RN            Radiation Therapy Patient Education    Person involved with teaching: Patient    Patient educational needs for self management of treatment-related side effects assessment completed.  [facility] Patient Ed tab contains Patient Learning Assessment    Education Materials Given  Radiation Treatment For Cancer, Radiation Therapy to the Breast Guidelines, Oncology Supportive Care Services , Welcome Letter, and Insurance PA Information    Educational Topics Discussed  Side effects expected    Response To Teaching  Verbalizes understanding    GYN Only  Vaginal Dilator-given and educated: N/A    Referrals sent: None    Chemotherapy?  " No

## 2025-07-21 LAB
LAB ORDER RESULT STATUS: NORMAL
LAB ORDER RESULT STATUS: NORMAL

## 2025-07-29 ENCOUNTER — TELEPHONE (OUTPATIENT)
Dept: RADIATION ONCOLOGY | Facility: HOSPITAL | Age: 65
End: 2025-07-29
Payer: COMMERCIAL

## 2025-07-29 ENCOUNTER — PATIENT OUTREACH (OUTPATIENT)
Dept: ONCOLOGY | Facility: HOSPITAL | Age: 65
End: 2025-07-29
Payer: COMMERCIAL

## 2025-07-29 LAB — LAB ORDER RESULT STATUS: NORMAL

## 2025-07-29 NOTE — PROGRESS NOTES
[facility]: Cancer Care                                                                                            Situation: Patient chart reviewed by care coordinator.    Background: Patient with a newly diagnosed right breast invasive ductal carcinoma. She had multiple masses with the largest being 17 mm in size. This is ER/AK positive and HER2/heydi negative by FISH. Right breast mastectomy on 6/18/2025. 4 lymph nodes were removed and none of them showed cancer. Oncotype score came back at 3.    Assessment: Patient was seen in mid July by Dr Becerra as well as Dr. Street.  Dr Becerra had sent in anastrozole letting her know that she should start taking it after radiation therapy if her Oncotype was below 25.  Since her Oncotype came back at 3, I wanted to follow-up with her to see if she has made any decisions about radiation therapy.    Plan/Recommendations: [employer] message sent to patient asking her if she has made any decisions on whether or not she plans to do radiation therapy.  If she decides against radiation therapy, I did ask her when she plans to start taking the anastrozole.  And if she already started taking the anastrozole, asked her what date she started.    I will check back sometime next week if she has not gotten back to us.    Signature:  Zulay Galvan RN

## 2025-07-29 NOTE — TELEPHONE ENCOUNTER
Called pt at this time at the request of Dr. Rodrigues, He spoke with Cynthia Becerra and Neo and all agree radiation isn't indicated in her case. She was very happy to hear this and will follow up with Dr. Larson about recommendations and follow up with Zulay ESPINOZACC about hormone blocker.     I will take her off our working queue for radiation but encouraged her to call with further questions.     Denisse Graham RN  Rad Onc [place]

## (undated) DEVICE — PITCHER STERILE 1000ML  SSK9004A

## (undated) DEVICE — KIT TURNOVER FAIRVIEW SOUTHDALE FULL SP3889

## (undated) DEVICE — DRSG TEGADERM 4X4 3/4" 1626W

## (undated) DEVICE — PREP CHLORAPREP 26ML TINTED HI-LITE ORANGE 930815

## (undated) DEVICE — NEEDLE HYPO MAGELLAN SAFETY 22GA 1 1/2IN 8881850215

## (undated) DEVICE — BLADE KNIFE SURG 10 371110

## (undated) DEVICE — SYR 10ML LL W/O NDL 302995

## (undated) DEVICE — GLOVE BIOGEL PI ULTRATOUCH G SZ 6.0 42160

## (undated) DEVICE — DRAPE CHEST 100X72X124 89227

## (undated) DEVICE — SUTURE PDS 3-0 FS-2 Z423H

## (undated) DEVICE — BAG DECANTER STERILE WHITE DYNJDEC09

## (undated) DEVICE — ESU GROUND PAD ADULT REM W/15' CORD E7507DB

## (undated) DEVICE — SU MONOCRYL+ 4-0 18IN PS2 UND MCP496G

## (undated) DEVICE — GLOVE BIOGEL PI SZ 6.5 40865

## (undated) DEVICE — SUTURE PDS 3-0 18IN PS-2 + UND PDP497G

## (undated) DEVICE — A3 SUPPLIES- SEE NURSING INFO PAGE

## (undated) DEVICE — DRAPE SHEET REV FOLD 3/4 9349

## (undated) DEVICE — UNDERPAD 36X30 PREMIERPRO MAX ABS NS LF 676111

## (undated) DEVICE — HOLDER DRAINAGE BULB 0814-8220

## (undated) DEVICE — SPONGE LAP 18X18" X8435

## (undated) DEVICE — SU ETHICON STRATAFIX SPR PS-2 3-0 30X30CM SXMP2B408

## (undated) DEVICE — ESU PENCIL SMOKE EVAC W/ROCKER SWITCH 0703-047-000

## (undated) DEVICE — ESU ELEC BLADE 2.75" COATED/INSULATED E1455

## (undated) DEVICE — SOL NACL 0.9% IRRIG 1000ML BOTTLE 2F7124

## (undated) DEVICE — VIAL DECANTER STERILE WHITE DYNJDEC06

## (undated) DEVICE — GOWN IMPERVIOUS BREATHABLE SMART LG 89015

## (undated) DEVICE — SU DERMABOND PRINEO 42CM CLR422US

## (undated) DEVICE — SUCTION MANIFOLD NEPTUNE 2 SYS 1 PORT 702-025-000

## (undated) DEVICE — PROBE ELECTROSURGICAL TRUNODE GAMMA 120-807605

## (undated) DEVICE — STPL SKIN 35W 6.9MM  PXW35

## (undated) DEVICE — SU ETHILON 3-0 PS-1 18" 1663G

## (undated) DEVICE — BLADE KNIFE SURG 15 371115

## (undated) DEVICE — SU SILK 2-0 SH 30" K833H

## (undated) DEVICE — SUTURE VICRYL+ 2-0 27IN SH UND VCP417H

## (undated) DEVICE — CUSTOM PACK GEN MAJOR SBA5BGMHEA

## (undated) DEVICE — GLOVE UNDER INDICATOR PI SZ 6.5 LF 41665

## (undated) DEVICE — SUTURE MONOCRYL 3-0 18 PS2 UND MCP497G

## (undated) DEVICE — GOWN LG DISP 9515

## (undated) DEVICE — DRSG BIOPATCH GERMICIDAL SPLIT SPONGE 4MM MED 4150

## (undated) DEVICE — SU ETHILON 2-0 FS 18" 664G

## (undated) DEVICE — SOL WATER IRRIG 1000ML BOTTLE 2F7114

## (undated) DEVICE — BNDG ELASTIC 6"X5YDS UNSTERILE 6611-60

## (undated) DEVICE — DRSG ABD TNDRSRB WET PRUF 8IN X 10IN STRL  9194A

## (undated) DEVICE — SUCTION TIP YANKAUER W/O VENT K86

## (undated) DEVICE — DRSG KERLIX FLUFFS X5

## (undated) RX ORDER — FENTANYL CITRATE 50 UG/ML
INJECTION, SOLUTION INTRAMUSCULAR; INTRAVENOUS
Status: DISPENSED
Start: 2025-06-18

## (undated) RX ORDER — LABETALOL HYDROCHLORIDE 5 MG/ML
INJECTION, SOLUTION INTRAVENOUS
Status: DISPENSED
Start: 2025-06-18

## (undated) RX ORDER — CEFAZOLIN SODIUM 1 G/3ML
INJECTION, POWDER, FOR SOLUTION INTRAMUSCULAR; INTRAVENOUS
Status: DISPENSED
Start: 2025-06-18

## (undated) RX ORDER — BUPIVACAINE HYDROCHLORIDE 2.5 MG/ML
INJECTION, SOLUTION INFILTRATION; PERINEURAL
Status: DISPENSED
Start: 2025-06-18